# Patient Record
Sex: FEMALE | Race: WHITE | NOT HISPANIC OR LATINO | Employment: FULL TIME | URBAN - NONMETROPOLITAN AREA
[De-identification: names, ages, dates, MRNs, and addresses within clinical notes are randomized per-mention and may not be internally consistent; named-entity substitution may affect disease eponyms.]

---

## 2018-06-04 ENCOUNTER — OFFICE VISIT (OUTPATIENT)
Dept: SURGERY | Facility: CLINIC | Age: 53
End: 2018-06-04

## 2018-06-04 VITALS
BODY MASS INDEX: 34.66 KG/M2 | HEIGHT: 64 IN | WEIGHT: 203 LBS | SYSTOLIC BLOOD PRESSURE: 110 MMHG | RESPIRATION RATE: 16 BRPM | DIASTOLIC BLOOD PRESSURE: 74 MMHG | HEART RATE: 72 BPM

## 2018-06-04 DIAGNOSIS — R11.0 NAUSEA: ICD-10-CM

## 2018-06-04 DIAGNOSIS — R10.9 RIGHT SIDED ABDOMINAL PAIN: ICD-10-CM

## 2018-06-04 DIAGNOSIS — Z86.010 HISTORY OF COLON POLYPS: ICD-10-CM

## 2018-06-04 DIAGNOSIS — R14.0 BLOATING: ICD-10-CM

## 2018-06-04 DIAGNOSIS — R19.4 CHANGE IN BOWEL HABITS: ICD-10-CM

## 2018-06-04 DIAGNOSIS — R10.9 LEFT SIDED ABDOMINAL PAIN: Primary | ICD-10-CM

## 2018-06-04 DIAGNOSIS — R13.19 OTHER DYSPHAGIA: ICD-10-CM

## 2018-06-04 PROBLEM — Z86.0100 HISTORY OF COLON POLYPS: Status: ACTIVE | Noted: 2018-06-04

## 2018-06-04 PROCEDURE — 99214 OFFICE O/P EST MOD 30 MIN: CPT | Performed by: SURGERY

## 2018-06-04 RX ORDER — CITALOPRAM 20 MG/1
20 TABLET ORAL NIGHTLY
COMMUNITY

## 2018-06-04 RX ORDER — OMEPRAZOLE 20 MG/1
20 CAPSULE, DELAYED RELEASE ORAL DAILY
COMMUNITY

## 2018-06-04 RX ORDER — LEVOTHYROXINE SODIUM 0.03 MG/1
25 TABLET ORAL DAILY
COMMUNITY

## 2018-06-04 RX ORDER — ATORVASTATIN CALCIUM 40 MG/1
40 TABLET, FILM COATED ORAL NIGHTLY
COMMUNITY

## 2018-06-04 RX ORDER — LEVOTHYROXINE SODIUM 0.2 MG/1
225 TABLET ORAL DAILY
COMMUNITY

## 2018-06-04 NOTE — H&P
Gisela Martinez 53 y.o. female presents @ the req of Dr. Nick for eval of EGD/C-SCOPE. Pt c/o LLQ and RLQ pain, bloating, chg in bowel habits, constipation/diarrhea alternating, CP, diff swallowing solids and liquids, + fam H/O colon CA, + fam H/O colon polyps, + personal H/O polys, stating last c-scope =18 polyps, heartburn, and nausea.  Pt reports recent inpt admission for CP and passing out @ [facility].  Pt states she was seen, eval, and treated per Dr. Presley.  Echo and stress test performed.  Pt states all results neg.       HPI     Above noted and agree.  Gisela is known to my service.  I have removed her gallbladder and removed many colon polyps.  She has a strong family history of colon issues.  Her father had colon cancer.  Her sister started having colon polyps removed at age 7.  Her last colonoscopy was 2 years ago where 18 polyps were removed.  She has been having issues with constipation alternating with diarrhea.  She has been having left sided and right sided abdominal pain.  She feels bloated and she has been having difficulty swallowing solids and liquids.  She has been feeling hoarse for the last several weeks.  She had an episode of chest pain and was admitted to [facility].  She had a negative stress test and negative echocardiogram.  She still feels tight.  She is getting a pulmonology workup.  She also gets bloated at times.  She also is having issues with spinal stenosis and arthritis of her neck.  She is otherwise without complaints.     Review of Systems   All other systems reviewed and are negative.          Past Medical History:   Diagnosis Date   • Anxiety    • Arthritis    • GERD (gastroesophageal reflux disease)    • Hyperlipidemia    • Hypothyroidism (acquired)    • PUD (peptic ulcer disease)    • Seasonal allergies            Past Surgical History:   Procedure Laterality Date   • COLONOSCOPY W/ POLYPECTOMY     • HYSTERECTOMY     • LAPAROSCOPIC CHOLECYSTECTOMY     • TUBAL  "ABDOMINAL LIGATION             Physical Exam   Constitutional: She is oriented to person, place, and time. She appears well-developed and well-nourished.   HENT:   Head: Normocephalic and atraumatic.   Cardiovascular: Normal rate and regular rhythm.    Pulmonary/Chest: Effort normal and breath sounds normal.   Abdominal: Soft. Bowel sounds are normal.   Musculoskeletal: She exhibits no edema or deformity.   Neurological: She is alert and oriented to person, place, and time.   Skin: Skin is warm and dry.   Psychiatric: She has a normal mood and affect. Her behavior is normal.   Nursing note and vitals reviewed.          /74   Pulse 72   Resp 16   Ht 162.6 cm (64\")   Wt 92.1 kg (203 lb)   BMI 34.84 kg/m²          Gisela was seen today for egd and colonoscopy.    Diagnoses and all orders for this visit:    Left sided abdominal pain    Right sided abdominal pain    Change in bowel habits    History of colon polyps    Bloating    Nausea    Other dysphagia    We discussed tobacco cessation.  We will schedule Gisela for an EGD and colonoscopy.  I discussed with the patient the benefits and risks of performing endoscopy.  Benefits and risks were not limited to but including bleeding, infection, perforation, complications of anesthesia, aspiration.  The patient appeared to understand and is willing to proceed.    Thank you for allowing me to participate in the care of this interesting patient.            "

## 2018-06-04 NOTE — PROGRESS NOTES
Gisela Martinez 53 y.o. female presents @ the req of Dr. Nick for eval of EGD/C-SCOPE. Pt c/o LLQ and RLQ pain, bloating, chg in bowel habits, constipation/diarrhea alternating, CP, diff swallowing solids and liquids, + fam H/O colon CA, + fam H/O colon polyps, + personal H/O polys, stating last c-scope =18 polyps, heartburn, and nausea.  Pt reports recent inpt admission for CP and passing out @ [facility].  Pt states she was seen, eval, and treated per Dr. Presley.  Echo and stress test performed.  Pt states all results neg.       HPI     Above noted and agree.  Gisela is known to my service.  I have removed her gallbladder and removed many colon polyps.  She has a strong family history of colon issues.  Her father had colon cancer.  Her sister started having colon polyps removed at age 7.  Her last colonoscopy was 2 years ago where 18 polyps were removed.  She has been having issues with constipation alternating with diarrhea.  She has been having left sided and right sided abdominal pain.  She feels bloated and she has been having difficulty swallowing solids and liquids.  She has been feeling hoarse for the last several weeks.  She had an episode of chest pain and was admitted to [facility].  She had a negative stress test and negative echocardiogram.  She still feels tight.  She is getting a pulmonology workup.  She also gets bloated at times.  She also is having issues with spinal stenosis and arthritis of her neck.  She is otherwise without complaints.     Review of Systems   All other systems reviewed and are negative.          Past Medical History:   Diagnosis Date   • Anxiety    • Arthritis    • GERD (gastroesophageal reflux disease)    • Hyperlipidemia    • Hypothyroidism (acquired)    • PUD (peptic ulcer disease)    • Seasonal allergies            Past Surgical History:   Procedure Laterality Date   • COLONOSCOPY W/ POLYPECTOMY     • HYSTERECTOMY     • LAPAROSCOPIC CHOLECYSTECTOMY     • TUBAL  "ABDOMINAL LIGATION             Physical Exam   Constitutional: She is oriented to person, place, and time. She appears well-developed and well-nourished.   HENT:   Head: Normocephalic and atraumatic.   Cardiovascular: Normal rate and regular rhythm.    Pulmonary/Chest: Effort normal and breath sounds normal.   Abdominal: Soft. Bowel sounds are normal.   Musculoskeletal: She exhibits no edema or deformity.   Neurological: She is alert and oriented to person, place, and time.   Skin: Skin is warm and dry.   Psychiatric: She has a normal mood and affect. Her behavior is normal.   Nursing note and vitals reviewed.          /74   Pulse 72   Resp 16   Ht 162.6 cm (64\")   Wt 92.1 kg (203 lb)   BMI 34.84 kg/m²         Gisela was seen today for egd and colonoscopy.    Diagnoses and all orders for this visit:    Left sided abdominal pain    Right sided abdominal pain    Change in bowel habits    History of colon polyps    Bloating    Nausea    Other dysphagia    We discussed tobacco cessation.  We will schedule Gisela for an EGD and colonoscopy.  I discussed with the patient the benefits and risks of performing endoscopy.  Benefits and risks were not limited to but including bleeding, infection, perforation, complications of anesthesia, aspiration.  The patient appeared to understand and is willing to proceed.    Thank you for allowing me to participate in the care of this interesting patient.          "

## 2018-06-07 ENCOUNTER — TELEPHONE (OUTPATIENT)
Dept: SURGERY | Facility: CLINIC | Age: 53
End: 2018-06-07

## 2018-06-07 NOTE — TELEPHONE ENCOUNTER
Appt sched with Dr. Beatty per Dr. Stephens's orders 06/11/2018 @10:00 am.  Dr. Beatty's office reports pt has been sched to see him 2 times previous and [facility].  Pt informed and reports she was never notified of any other appts and she will keep this appt sched in [place].

## 2018-06-18 ENCOUNTER — ANESTHESIA EVENT (OUTPATIENT)
Dept: PERIOP | Facility: HOSPITAL | Age: 53
End: 2018-06-18

## 2018-06-18 RX ORDER — TIZANIDINE HYDROCHLORIDE 2 MG/1
2 CAPSULE, GELATIN COATED ORAL 3 TIMES DAILY PRN
COMMUNITY

## 2018-06-18 RX ORDER — TRAMADOL HYDROCHLORIDE 50 MG/1
50 TABLET ORAL EVERY 8 HOURS PRN
COMMUNITY
End: 2018-10-03

## 2018-06-19 ENCOUNTER — HOSPITAL ENCOUNTER (OUTPATIENT)
Facility: HOSPITAL | Age: 53
Setting detail: HOSPITAL OUTPATIENT SURGERY
Discharge: HOME | End: 2018-06-19
Attending: SURGERY | Admitting: SURGERY

## 2018-06-19 ENCOUNTER — ANESTHESIA (OUTPATIENT)
Dept: PERIOP | Facility: HOSPITAL | Age: 53
End: 2018-06-19

## 2018-06-19 VITALS
OXYGEN SATURATION: 96 % | HEART RATE: 53 BPM | SYSTOLIC BLOOD PRESSURE: 115 MMHG | WEIGHT: 197.4 LBS | RESPIRATION RATE: 16 BRPM | DIASTOLIC BLOOD PRESSURE: 68 MMHG | HEIGHT: 64 IN | TEMPERATURE: 97.9 F | BODY MASS INDEX: 33.7 KG/M2

## 2018-06-19 DIAGNOSIS — R14.0 BLOATING: ICD-10-CM

## 2018-06-19 DIAGNOSIS — R13.19 OTHER DYSPHAGIA: ICD-10-CM

## 2018-06-19 DIAGNOSIS — R10.9 RIGHT SIDED ABDOMINAL PAIN: ICD-10-CM

## 2018-06-19 DIAGNOSIS — R11.0 NAUSEA: ICD-10-CM

## 2018-06-19 DIAGNOSIS — Z86.010 HISTORY OF COLON POLYPS: ICD-10-CM

## 2018-06-19 DIAGNOSIS — R10.9 LEFT SIDED ABDOMINAL PAIN: ICD-10-CM

## 2018-06-19 DIAGNOSIS — R19.4 CHANGE IN BOWEL HABITS: ICD-10-CM

## 2018-06-19 PROCEDURE — 25010000002 GLUCAGON (RDNA) PER 1 MG: Performed by: NURSE ANESTHETIST, CERTIFIED REGISTERED

## 2018-06-19 PROCEDURE — 87081 CULTURE SCREEN ONLY: CPT | Performed by: SURGERY

## 2018-06-19 PROCEDURE — 45380 COLONOSCOPY AND BIOPSY: CPT | Performed by: SURGERY

## 2018-06-19 PROCEDURE — 25010000002 PROPOFOL 10 MG/ML EMULSION: Performed by: NURSE ANESTHETIST, CERTIFIED REGISTERED

## 2018-06-19 PROCEDURE — 43239 EGD BIOPSY SINGLE/MULTIPLE: CPT | Performed by: SURGERY

## 2018-06-19 RX ORDER — SODIUM CHLORIDE, SODIUM LACTATE, POTASSIUM CHLORIDE, CALCIUM CHLORIDE 600; 310; 30; 20 MG/100ML; MG/100ML; MG/100ML; MG/100ML
9 INJECTION, SOLUTION INTRAVENOUS CONTINUOUS
Status: DISCONTINUED | OUTPATIENT
Start: 2018-06-19 | End: 2018-06-19 | Stop reason: HOSPADM

## 2018-06-19 RX ORDER — PROPOFOL 10 MG/ML
VIAL (ML) INTRAVENOUS AS NEEDED
Status: DISCONTINUED | OUTPATIENT
Start: 2018-06-19 | End: 2018-06-19 | Stop reason: SURG

## 2018-06-19 RX ORDER — LIDOCAINE HYDROCHLORIDE 10 MG/ML
0.5 INJECTION, SOLUTION EPIDURAL; INFILTRATION; INTRACAUDAL; PERINEURAL ONCE AS NEEDED
Status: COMPLETED | OUTPATIENT
Start: 2018-06-19 | End: 2018-06-19

## 2018-06-19 RX ORDER — SUCRALFATE 1 G/1
1 TABLET ORAL 4 TIMES DAILY
Qty: 120 TABLET | Refills: 1 | Status: SHIPPED | OUTPATIENT
Start: 2018-06-19 | End: 2019-06-19

## 2018-06-19 RX ORDER — LIDOCAINE HYDROCHLORIDE 20 MG/ML
INJECTION, SOLUTION INFILTRATION; PERINEURAL AS NEEDED
Status: DISCONTINUED | OUTPATIENT
Start: 2018-06-19 | End: 2018-06-19 | Stop reason: SURG

## 2018-06-19 RX ORDER — MEPERIDINE HYDROCHLORIDE 25 MG/ML
12.5 INJECTION INTRAMUSCULAR; INTRAVENOUS; SUBCUTANEOUS
Status: CANCELLED | OUTPATIENT
Start: 2018-06-19 | End: 2018-06-20

## 2018-06-19 RX ORDER — SODIUM CHLORIDE 0.9 % (FLUSH) 0.9 %
1-10 SYRINGE (ML) INJECTION AS NEEDED
Status: DISCONTINUED | OUTPATIENT
Start: 2018-06-19 | End: 2018-06-19 | Stop reason: HOSPADM

## 2018-06-19 RX ORDER — SODIUM CHLORIDE, SODIUM LACTATE, POTASSIUM CHLORIDE, CALCIUM CHLORIDE 600; 310; 30; 20 MG/100ML; MG/100ML; MG/100ML; MG/100ML
100 INJECTION, SOLUTION INTRAVENOUS CONTINUOUS
Status: CANCELLED | OUTPATIENT
Start: 2018-06-19

## 2018-06-19 RX ORDER — ONDANSETRON 2 MG/ML
4 INJECTION INTRAMUSCULAR; INTRAVENOUS ONCE AS NEEDED
Status: CANCELLED | OUTPATIENT
Start: 2018-06-19

## 2018-06-19 RX ORDER — SODIUM CHLORIDE 9 MG/ML
40 INJECTION, SOLUTION INTRAVENOUS AS NEEDED
Status: DISCONTINUED | OUTPATIENT
Start: 2018-06-19 | End: 2018-06-19 | Stop reason: HOSPADM

## 2018-06-19 RX ORDER — GLYCOPYRROLATE 0.2 MG/ML
INJECTION INTRAMUSCULAR; INTRAVENOUS AS NEEDED
Status: DISCONTINUED | OUTPATIENT
Start: 2018-06-19 | End: 2018-06-19 | Stop reason: SURG

## 2018-06-19 RX ADMIN — LIDOCAINE HYDROCHLORIDE 0.5 ML: 10 INJECTION, SOLUTION EPIDURAL; INFILTRATION; INTRACAUDAL; PERINEURAL at 08:45

## 2018-06-19 RX ADMIN — PROPOFOL 50 MG: 10 INJECTION, EMULSION INTRAVENOUS at 10:06

## 2018-06-19 RX ADMIN — PROPOFOL 50 MG: 10 INJECTION, EMULSION INTRAVENOUS at 10:20

## 2018-06-19 RX ADMIN — PROPOFOL 50 MG: 10 INJECTION, EMULSION INTRAVENOUS at 10:19

## 2018-06-19 RX ADMIN — GLYCOPYRROLATE 0.2 MG: 0.2 INJECTION INTRAMUSCULAR; INTRAVENOUS at 09:54

## 2018-06-19 RX ADMIN — GLUCAGON HYDROCHLORIDE 1 MG: KIT at 10:13

## 2018-06-19 RX ADMIN — SODIUM CHLORIDE, POTASSIUM CHLORIDE, SODIUM LACTATE AND CALCIUM CHLORIDE 9 ML/HR: 600; 310; 30; 20 INJECTION, SOLUTION INTRAVENOUS at 08:45

## 2018-06-19 RX ADMIN — LIDOCAINE HYDROCHLORIDE 100 MG: 20 INJECTION, SOLUTION INFILTRATION; PERINEURAL at 09:58

## 2018-06-19 RX ADMIN — PROPOFOL 50 MG: 10 INJECTION, EMULSION INTRAVENOUS at 10:00

## 2018-06-19 RX ADMIN — PROPOFOL 50 MG: 10 INJECTION, EMULSION INTRAVENOUS at 10:24

## 2018-06-19 RX ADMIN — PROPOFOL 50 MG: 10 INJECTION, EMULSION INTRAVENOUS at 10:25

## 2018-06-19 RX ADMIN — PROPOFOL 100 MG: 10 INJECTION, EMULSION INTRAVENOUS at 09:58

## 2018-06-19 RX ADMIN — PROPOFOL 50 MG: 10 INJECTION, EMULSION INTRAVENOUS at 10:09

## 2018-06-19 RX ADMIN — PROPOFOL 40 MG: 10 INJECTION, EMULSION INTRAVENOUS at 10:30

## 2018-06-19 RX ADMIN — PROPOFOL 50 MG: 10 INJECTION, EMULSION INTRAVENOUS at 10:14

## 2018-06-19 RX ADMIN — PROPOFOL 50 MG: 10 INJECTION, EMULSION INTRAVENOUS at 10:03

## 2018-06-19 NOTE — ANESTHESIA PREPROCEDURE EVALUATION
Anesthesia Evaluation     no history of anesthetic complications:  NPO Solid Status: > 8 hours  NPO Liquid Status: > 8 hours           Airway   Dental      Pulmonary    (+) a smoker (1 ppd) Current Smoked day of surgery,   (-) sleep apnea  Cardiovascular       ROS comment: Recent negative workup    Neuro/Psych  (+) syncope (may 2018, work up negative), psychiatric history Anxiety,     GI/Hepatic/Renal/Endo    (+)  GERD (history of, none at present) well controlled, PUD,  hypothyroidism,   Diabetes: low blood sugar.    Musculoskeletal     (+) back pain (spinal stenosis), neck pain (arthritis), radiculopathy Left lower extremity and Right lower extremity  Abdominal    Substance History - negative use     OB/GYN negative ob/gyn ROS         Other   (+) arthritis (neck and lumbar spine, jaw)                   Anesthesia Plan    ASA 2     MAC     intravenous induction   Anesthetic plan and risks discussed with patient and spouse/significant other.

## 2018-06-19 NOTE — H&P (VIEW-ONLY)
Gisela Martinez 53 y.o. female presents @ the req of Dr. Nick for eval of EGD/C-SCOPE. Pt c/o LLQ and RLQ pain, bloating, chg in bowel habits, constipation/diarrhea alternating, CP, diff swallowing solids and liquids, + fam H/O colon CA, + fam H/O colon polyps, + personal H/O polys, stating last c-scope =18 polyps, heartburn, and nausea.  Pt reports recent inpt admission for CP and passing out @ [facility].  Pt states she was seen, eval, and treated per Dr. Presley.  Echo and stress test performed.  Pt states all results neg.       HPI     Above noted and agree.  Gisela is known to my service.  I have removed her gallbladder and removed many colon polyps.  She has a strong family history of colon issues.  Her father had colon cancer.  Her sister started having colon polyps removed at age 7.  Her last colonoscopy was 2 years ago where 18 polyps were removed.  She has been having issues with constipation alternating with diarrhea.  She has been having left sided and right sided abdominal pain.  She feels bloated and she has been having difficulty swallowing solids and liquids.  She has been feeling hoarse for the last several weeks.  She had an episode of chest pain and was admitted to [facility].  She had a negative stress test and negative echocardiogram.  She still feels tight.  She is getting a pulmonology workup.  She also gets bloated at times.  She also is having issues with spinal stenosis and arthritis of her neck.  She is otherwise without complaints.     Review of Systems   All other systems reviewed and are negative.          Past Medical History:   Diagnosis Date   • Anxiety    • Arthritis    • GERD (gastroesophageal reflux disease)    • Hyperlipidemia    • Hypothyroidism (acquired)    • PUD (peptic ulcer disease)    • Seasonal allergies            Past Surgical History:   Procedure Laterality Date   • COLONOSCOPY W/ POLYPECTOMY     • HYSTERECTOMY     • LAPAROSCOPIC CHOLECYSTECTOMY     • TUBAL  "ABDOMINAL LIGATION             Physical Exam   Constitutional: She is oriented to person, place, and time. She appears well-developed and well-nourished.   HENT:   Head: Normocephalic and atraumatic.   Cardiovascular: Normal rate and regular rhythm.    Pulmonary/Chest: Effort normal and breath sounds normal.   Abdominal: Soft. Bowel sounds are normal.   Musculoskeletal: She exhibits no edema or deformity.   Neurological: She is alert and oriented to person, place, and time.   Skin: Skin is warm and dry.   Psychiatric: She has a normal mood and affect. Her behavior is normal.   Nursing note and vitals reviewed.          /74   Pulse 72   Resp 16   Ht 162.6 cm (64\")   Wt 92.1 kg (203 lb)   BMI 34.84 kg/m²         Gisela was seen today for egd and colonoscopy.    Diagnoses and all orders for this visit:    Left sided abdominal pain    Right sided abdominal pain    Change in bowel habits    History of colon polyps    Bloating    Nausea    Other dysphagia    We discussed tobacco cessation.  We will schedule Gisela for an EGD and colonoscopy.  I discussed with the patient the benefits and risks of performing endoscopy.  Benefits and risks were not limited to but including bleeding, infection, perforation, complications of anesthesia, aspiration.  The patient appeared to understand and is willing to proceed.    Thank you for allowing me to participate in the care of this interesting patient.          "

## 2018-06-19 NOTE — OP NOTE
EGD and Colonoscopy Procedure Note      Pre-operative Diagnosis:  Nausea [R11.0]  Bloating [R14.0]  Change in bowel habits [R19.4]  Other dysphagia [R13.19]  Right sided abdominal pain [R10.9]  Left sided abdominal pain [R10.9]  History of colon polyps [Z86.010]    Post-operative Diagnosis: Gastritis, polyps of the right colon, left colon, and rectum    Procedure:  EGD with with biopsy for H. pylori with cold forceps and  Colonoscopy with polypectomy with cold forceps     Surgeon: Kat    Anesthetic: MAC     Estimated Blood Loss:  Minimal    Complications:  None    Indications:  Preop diagnosis    Findings/Treatments:   Gastritis-biopsy for H. pylori with cold forceps  Polyps of the right colon, left colon, and rectum-removed with cold forceps       Scope Withdrawal Time:  6 minutes      Recommendations:  Wait pathology      Procedure Details     After discussing the benefits and risks of an EGD and Colonoscopy, benefits and risks not limited to but including:  Bleeding, infection, perforation, aspiration; informed consent was signed.  The patient was taken into the endoscopy suite at [facility] and placed in the left lateral decubitus position.  MAC anesthesia was induced under appropriate monitoring.  Bite block was placed and the gastroscope was inserted thru such and advanced under direct vision to second portion of the duodenum.  A careful inspection was made as the gastroscope was withdrawn, including a retroflexed view of the proximal stomach; findings and interventions are described below.  If biopsies were taken, this was done with the cold biopsy forceps.    The bed was then rotated 108 degrees.  A rectal exam was performed.  Sphincter tone was normal.  The colonoscope was then inserted and carefully advanced to the cecum while visualizing the mucosa.  The cecum was identified by the ileocecal valve and the orifice of the appendix.  Once in the cecum, the scope withdrawn in the  right colon where a polyp was removed in piecemeal fashion with cold forceps.  The scope was then withdrawn while carefully evaluated mucosa and deflating air.  In left colon there was a polyp removed with cold forceps.  In the rectum there were 2 small polyps removed cold forceps.  The scope was then removed and Gisela was taken the recovery area in stable postoperative condition having tolerated her procedure well.    Anika Stephens DO

## 2018-06-19 NOTE — INTERVAL H&P NOTE
"  H&P reviewed. The patient was examined and there are no changes to the H&P.         /74   Pulse 72   Resp 16   Ht 162.6 cm (64\")   Wt 92.1 kg (203 lb)   BMI 34.84 kg/m²     "

## 2018-06-19 NOTE — ANESTHESIA POSTPROCEDURE EVALUATION
Patient: Gisela Martinez    Procedure Summary     Date:  06/19/18 Room / Location:  [facility] ENDOSCOPY 1 /  [facility] OR    Anesthesia Start:  0953 Anesthesia Stop:  1039    Procedures:       ESOPHAGOGASTRODUODENOSCOPY with ELLA test  (N/A Esophagus)      COLONOSCOPY and polypectomy (N/A ) Diagnosis:       Nausea      Bloating      Change in bowel habits      Other dysphagia      Right sided abdominal pain      Left sided abdominal pain      History of colon polyps      (Nausea [R11.0])      (Bloating [R14.0])      (Change in bowel habits [R19.4])      (Other dysphagia [R13.19])      (Right sided abdominal pain [R10.9])      (Left sided abdominal pain [R10.9])      (History of colon polyps [Z86.010])    Surgeon:  Anika Stephens DO Provider:  Juan Francisco Kaminski CRNA    Anesthesia Type:  MAC ASA Status:  2          Anesthesia Type: MAC  Last vitals  BP   104/71 (06/19/18 0859)   Temp   97.9 °F (36.6 °C) (06/19/18 0859)   Pulse   66 (06/19/18 0859)   Resp   16 (06/19/18 0859)     SpO2   95 % (06/19/18 0859)     Post Anesthesia Care and Evaluation    Patient location during evaluation: PHASE II  Patient participation: complete - patient cannot participate  Level of consciousness: awake and alert  Pain score: 0  Pain management: adequate  Airway patency: patent  Anesthetic complications: No anesthetic complications  PONV Status: none  Cardiovascular status: acceptable  Respiratory status: acceptable  Hydration status: acceptable

## 2018-06-20 LAB — UREASE TISS QL: NEGATIVE

## 2018-06-21 LAB
LAB AP CASE REPORT: NORMAL
PATH REPORT.FINAL DX SPEC: NORMAL

## 2018-07-13 ENCOUNTER — OFFICE VISIT (OUTPATIENT)
Dept: SURGERY | Facility: CLINIC | Age: 53
End: 2018-07-13

## 2018-07-13 DIAGNOSIS — K29.00 OTHER ACUTE GASTRITIS WITHOUT HEMORRHAGE: Primary | ICD-10-CM

## 2018-07-13 DIAGNOSIS — K63.5 POLYP OF COLON, UNSPECIFIED PART OF COLON, UNSPECIFIED TYPE: ICD-10-CM

## 2018-07-13 PROCEDURE — 99213 OFFICE O/P EST LOW 20 MIN: CPT | Performed by: SURGERY

## 2018-07-13 NOTE — PROGRESS NOTES
Gisela Martinez 53 y.o. female presents for PO FU egd/c-scope.  Pt feels well.  PCP Dr. Nick.    HPI     Gisela had Gastritis, polyps of the right colon, left colon, and rectum.  She is doing well and feeling better.  Her hoarseness has resolved.  She is tolerating a regular diet without nausea or vomiting.  She is moving her bowels well.  She has changed her diet and is feeling much better.      Review of Systems        Past Medical History:   Diagnosis Date   • Anxiety    • Arthritis    • GERD (gastroesophageal reflux disease)    • Goiter     27 years ago   • Hyperlipidemia    • Hypothyroidism (acquired)     Radiation at age 27 years   • PUD (peptic ulcer disease)    • Seasonal allergies    • Spinal stenosis            Past Surgical History:   Procedure Laterality Date   • ABDOMINAL SURGERY      Benign tumor on your left side   • COLONOSCOPY N/A 6/19/2018    Procedure: COLONOSCOPY and polypectomy;  Surgeon: Anika Stephens DO;  Location: [facility] OR;  Service: Gastroenterology   • COLONOSCOPY W/ POLYPECTOMY     • ENDOSCOPY N/A 6/19/2018    Procedure: ESOPHAGOGASTRODUODENOSCOPY with ELLA test ;  Surgeon: Anika Stephens DO;  Location: [facility] OR;  Service: Gastroenterology   • HYSTERECTOMY     • LAPAROSCOPIC CHOLECYSTECTOMY     • OTHER SURGICAL HISTORY      nodule removed from larynx   • OTHER SURGICAL HISTORY      radiated thyroid   • TUBAL ABDOMINAL LIGATION             Physical Exam   Constitutional: She is oriented to person, place, and time. She appears well-developed and well-nourished.   HENT:   Head: Normocephalic and atraumatic.   Cardiovascular: Normal rate and regular rhythm.    Pulmonary/Chest: Effort normal and breath sounds normal.   Abdominal: Soft. Bowel sounds are normal.   Musculoskeletal: She exhibits no edema or deformity.   Neurological: She is alert and oriented to person, place, and time.   Skin: Skin is warm and dry.   Psychiatric: She has a normal mood and affect. Her behavior  is normal.   Nursing note reviewed.          There were no vitals taken for this visit.        Gisela was seen today for post-op follow-up.    Diagnoses and all orders for this visit:    Other acute gastritis without hemorrhage    Polyp of colon, unspecified part of colon, unspecified type    We discussed tobacco cessation and dietary changes.  I gave her a PUD diet sheet.  She will need a three year repeat colonoscopy.    Thank you for allowing me to participate in the care of this interesting patient.

## 2018-08-15 ENCOUNTER — OFFICE VISIT (OUTPATIENT)
Dept: ORTHOPEDIC SURGERY | Facility: CLINIC | Age: 53
End: 2018-08-15

## 2018-08-15 VITALS
DIASTOLIC BLOOD PRESSURE: 77 MMHG | SYSTOLIC BLOOD PRESSURE: 114 MMHG | HEART RATE: 66 BPM | WEIGHT: 190 LBS | HEIGHT: 65 IN | BODY MASS INDEX: 31.65 KG/M2

## 2018-08-15 DIAGNOSIS — G56.03 BILATERAL CARPAL TUNNEL SYNDROME: Primary | ICD-10-CM

## 2018-08-15 PROCEDURE — 99203 OFFICE O/P NEW LOW 30 MIN: CPT | Performed by: ORTHOPAEDIC SURGERY

## 2018-08-15 NOTE — PROGRESS NOTES
Subjective: Bilateral wrist pain right worse than left     Patient ID: Gisela Martinez is a 53 y.o. female.    Chief Complaint:    History of Present Illness 53-year-old female right-hand-dominant is seen for bilateral wrist pain that she's had for over 9 months to right is worse than the left.  She's had symptoms locking with an abdomen in the past 9 months it's Worse when she's having night paresthesia with numbness and just about all the time.  She's taken occasional over-the-counter anti-inflammatories without much relief.  EMGs confirmed moderate bilateral carpal tunnel syndrome right worse than left with the amount was a patient of both the motor and sensory nerve branch.  Impingement is confined to the carpal tunnel       Social History     Occupational History   • Not on file.     Social History Main Topics   • Smoking status: Current Every Day Smoker     Packs/day: 1.00     Years: 20.00     Types: Cigarettes   • Smokeless tobacco: Never Used   • Alcohol use No   • Drug use: No   • Sexual activity: Defer      Review of Systems   Constitutional: Negative for chills, diaphoresis, fever and unexpected weight change.   HENT: Negative for hearing loss, nosebleeds, sore throat and tinnitus.    Eyes: Negative for pain and visual disturbance.   Respiratory: Negative for cough, shortness of breath and wheezing.    Cardiovascular: Negative for chest pain and palpitations.   Gastrointestinal: Negative for abdominal pain, diarrhea, nausea and vomiting.   Endocrine: Negative for cold intolerance, heat intolerance and polydipsia.   Genitourinary: Negative for difficulty urinating, dysuria and hematuria.   Musculoskeletal: Positive for arthralgias and myalgias. Negative for joint swelling.   Skin: Negative for rash and wound.   Allergic/Immunologic: Negative for environmental allergies.   Neurological: Negative for dizziness, syncope and numbness.   Hematological: Does not bruise/bleed easily.    Psychiatric/Behavioral: Negative for dysphoric mood and sleep disturbance. The patient is not nervous/anxious.          Past Medical History:   Diagnosis Date   • Anxiety    • Arthritis    • GERD (gastroesophageal reflux disease)    • Goiter     27 years ago   • Hyperlipidemia    • Hypothyroidism (acquired)     Radiation at age 27 years   • PUD (peptic ulcer disease)    • Seasonal allergies    • Spinal stenosis      Past Surgical History:   Procedure Laterality Date   • ABDOMINAL SURGERY      Benign tumor on your left side   • COLONOSCOPY N/A 6/19/2018    Procedure: COLONOSCOPY and polypectomy;  Surgeon: Anika Stephens DO;  Location: [facility] OR;  Service: Gastroenterology   • COLONOSCOPY W/ POLYPECTOMY     • ENDOSCOPY N/A 6/19/2018    Procedure: ESOPHAGOGASTRODUODENOSCOPY with ELLA test ;  Surgeon: Anika Stephens DO;  Location: [facility] OR;  Service: Gastroenterology   • HYSTERECTOMY     • LAPAROSCOPIC CHOLECYSTECTOMY     • OTHER SURGICAL HISTORY      nodule removed from larynx   • OTHER SURGICAL HISTORY      radiated thyroid   • TUBAL ABDOMINAL LIGATION       Family History   Problem Relation Age of Onset   • Colon cancer Father    • Heart disease Father    • Colon polyps Sister          Objective:  Vitals:    08/15/18 1517   BP: 114/77   Pulse: 66     1    08/15/18  1517   Weight: 86.2 kg (190 lb)     Body mass index is 31.62 kg/m².       Ortho Exam  she is alert and oriented ×3.  Head is normocephalic sclerae clear.  Right upper extremity shows full range of motion of the shoulder the elbow.  She has no radicular pain with range of motion to the neck.  Markedly positive Tinel's and Phalen's at the volar aspect of the right wrist paresthesia in the thumb index long and ring finger.  No thenar atrophy is noted.  Good capillary refill the skin is cool to touch no swelling is noted.  Left hand similarly shows full range of motion of the elbow and of the shoulder.  Markedly positive Tinel's and Phalen's at the  volar aspect of the wrist no thenar atrophy.  Paresthesia involving the thumb through ring finger.  Good capillary refill.  Tolerating anti-inflammatories again with some decrease in her symptoms not completely.  She's had cortisone injections in the past which has not given her long-lasting relief.    Assessment:       1. Bilateral carpal tunnel syndrome          Plan: Over 20 minutes was spent reviewing the patient's EMG reports of both wrists and her physical findings and outlined treatment plan going forward.  She has moderate symptoms and findings on EMG of both wrist right worse than left.  Side discussed with the patient I believe that she artery has moderate symptoms with compression and changes of both the motor and sensory nerve I believe the conservative way to treat this is with surgical decompression and prevent permanent numbness in the hand.  Discussed the surgery was involved in a rehabilitation with the patient and she was in agreement and wants to proceed with right hand ASAP tenderness set up for next Friday pending prep evaluation by her primary care physician            Work Status:    JEANE query complete.    Orders:  Orders Placed This Encounter   Procedures   • SCANNED - IMAGING       Medications:  No orders of the defined types were placed in this encounter.      Followup:  Return in about 1 week (around 8/22/2018).          Dragon transcription disclaimer     Much of this encounter note is an electronic transcription/translation of spoken language to printed text. The electronic translation of spoken language may permit erroneous, or at times, nonsensical words or phrases to be inadvertently transcribed. Although I have reviewed the note for such errors, some may still exist.

## 2018-08-16 ENCOUNTER — PREP FOR SURGERY (OUTPATIENT)
Dept: OTHER | Facility: HOSPITAL | Age: 53
End: 2018-08-16

## 2018-08-16 DIAGNOSIS — G56.01 CARPAL TUNNEL SYNDROME OF RIGHT WRIST: Primary | ICD-10-CM

## 2018-08-30 ENCOUNTER — ANESTHESIA EVENT (OUTPATIENT)
Dept: PERIOP | Facility: HOSPITAL | Age: 53
End: 2018-08-30

## 2018-08-31 ENCOUNTER — ANESTHESIA (OUTPATIENT)
Dept: PERIOP | Facility: HOSPITAL | Age: 53
End: 2018-08-31

## 2018-09-10 ENCOUNTER — TELEPHONE (OUTPATIENT)
Dept: ORTHOPEDIC SURGERY | Facility: CLINIC | Age: 53
End: 2018-09-10

## 2018-09-10 PROCEDURE — S0260 H&P FOR SURGERY: HCPCS | Performed by: ORTHOPAEDIC SURGERY

## 2018-09-11 ENCOUNTER — HOSPITAL ENCOUNTER (OUTPATIENT)
Facility: HOSPITAL | Age: 53
Setting detail: HOSPITAL OUTPATIENT SURGERY
Discharge: HOME | End: 2018-09-11
Attending: ORTHOPAEDIC SURGERY | Admitting: NURSE ANESTHETIST, CERTIFIED REGISTERED

## 2018-09-11 VITALS
RESPIRATION RATE: 16 BRPM | SYSTOLIC BLOOD PRESSURE: 133 MMHG | OXYGEN SATURATION: 96 % | BODY MASS INDEX: 35.28 KG/M2 | DIASTOLIC BLOOD PRESSURE: 70 MMHG | TEMPERATURE: 97.3 F | HEART RATE: 64 BPM | WEIGHT: 212 LBS

## 2018-09-11 DIAGNOSIS — G56.01 CARPAL TUNNEL SYNDROME OF RIGHT WRIST: ICD-10-CM

## 2018-09-11 PROCEDURE — 25010000002 PROPOFOL 10 MG/ML EMULSION: Performed by: NURSE ANESTHETIST, CERTIFIED REGISTERED

## 2018-09-11 PROCEDURE — 25010000002 DEXAMETHASONE PER 1 MG: Performed by: NURSE ANESTHETIST, CERTIFIED REGISTERED

## 2018-09-11 PROCEDURE — 25010000002 ONDANSETRON PER 1 MG: Performed by: NURSE ANESTHETIST, CERTIFIED REGISTERED

## 2018-09-11 PROCEDURE — 25010000002 MIDAZOLAM PER 1 MG: Performed by: NURSE ANESTHETIST, CERTIFIED REGISTERED

## 2018-09-11 PROCEDURE — 64721 CARPAL TUNNEL SURGERY: CPT | Performed by: ORTHOPAEDIC SURGERY

## 2018-09-11 PROCEDURE — 25010000002 ROPIVACAINE PER 1 MG: Performed by: NURSE ANESTHETIST, CERTIFIED REGISTERED

## 2018-09-11 RX ORDER — DEXAMETHASONE SODIUM PHOSPHATE 4 MG/ML
8 INJECTION, SOLUTION INTRA-ARTICULAR; INTRALESIONAL; INTRAMUSCULAR; INTRAVENOUS; SOFT TISSUE ONCE
Status: COMPLETED | OUTPATIENT
Start: 2018-09-11 | End: 2018-09-11

## 2018-09-11 RX ORDER — MAGNESIUM HYDROXIDE 1200 MG/15ML
LIQUID ORAL AS NEEDED
Status: DISCONTINUED | OUTPATIENT
Start: 2018-09-11 | End: 2018-09-11 | Stop reason: HOSPADM

## 2018-09-11 RX ORDER — ONDANSETRON 2 MG/ML
4 INJECTION INTRAMUSCULAR; INTRAVENOUS ONCE AS NEEDED
Status: CANCELLED | OUTPATIENT
Start: 2018-09-11

## 2018-09-11 RX ORDER — SODIUM CHLORIDE 9 MG/ML
40 INJECTION, SOLUTION INTRAVENOUS AS NEEDED
Status: DISCONTINUED | OUTPATIENT
Start: 2018-09-11 | End: 2018-09-11 | Stop reason: HOSPADM

## 2018-09-11 RX ORDER — LIDOCAINE HYDROCHLORIDE 20 MG/ML
INJECTION, SOLUTION EPIDURAL; INFILTRATION; INTRACAUDAL; PERINEURAL AS NEEDED
Status: DISCONTINUED | OUTPATIENT
Start: 2018-09-11 | End: 2018-09-11 | Stop reason: HOSPADM

## 2018-09-11 RX ORDER — LIDOCAINE HYDROCHLORIDE 20 MG/ML
INJECTION, SOLUTION INFILTRATION; PERINEURAL AS NEEDED
Status: DISCONTINUED | OUTPATIENT
Start: 2018-09-11 | End: 2018-09-11 | Stop reason: SURG

## 2018-09-11 RX ORDER — SODIUM CHLORIDE, SODIUM LACTATE, POTASSIUM CHLORIDE, CALCIUM CHLORIDE 600; 310; 30; 20 MG/100ML; MG/100ML; MG/100ML; MG/100ML
100 INJECTION, SOLUTION INTRAVENOUS CONTINUOUS
Status: CANCELLED | OUTPATIENT
Start: 2018-09-11

## 2018-09-11 RX ORDER — ONDANSETRON 2 MG/ML
4 INJECTION INTRAMUSCULAR; INTRAVENOUS ONCE AS NEEDED
Status: COMPLETED | OUTPATIENT
Start: 2018-09-11 | End: 2018-09-11

## 2018-09-11 RX ORDER — HYDROCODONE BITARTRATE AND ACETAMINOPHEN 5; 325 MG/1; MG/1
1 TABLET ORAL EVERY 4 HOURS PRN
Qty: 30 TABLET | Refills: 0 | Status: SHIPPED | OUTPATIENT
Start: 2018-09-11 | End: 2018-10-03

## 2018-09-11 RX ORDER — LIDOCAINE HYDROCHLORIDE 15 MG/ML
INJECTION, SOLUTION EPIDURAL; INFILTRATION; INTRACAUDAL; PERINEURAL AS NEEDED
Status: DISCONTINUED | OUTPATIENT
Start: 2018-09-11 | End: 2018-09-11 | Stop reason: SURG

## 2018-09-11 RX ORDER — MIDAZOLAM HYDROCHLORIDE 1 MG/ML
2 INJECTION INTRAMUSCULAR; INTRAVENOUS
Status: DISCONTINUED | OUTPATIENT
Start: 2018-09-11 | End: 2018-09-11 | Stop reason: HOSPADM

## 2018-09-11 RX ORDER — SODIUM CHLORIDE, SODIUM LACTATE, POTASSIUM CHLORIDE, CALCIUM CHLORIDE 600; 310; 30; 20 MG/100ML; MG/100ML; MG/100ML; MG/100ML
9 INJECTION, SOLUTION INTRAVENOUS CONTINUOUS
Status: DISCONTINUED | OUTPATIENT
Start: 2018-09-11 | End: 2018-09-11 | Stop reason: HOSPADM

## 2018-09-11 RX ORDER — SODIUM CHLORIDE 0.9 % (FLUSH) 0.9 %
1-10 SYRINGE (ML) INJECTION AS NEEDED
Status: DISCONTINUED | OUTPATIENT
Start: 2018-09-11 | End: 2018-09-11 | Stop reason: HOSPADM

## 2018-09-11 RX ORDER — BACITRACIN ZINC 500 [USP'U]/G
OINTMENT TOPICAL AS NEEDED
Status: DISCONTINUED | OUTPATIENT
Start: 2018-09-11 | End: 2018-09-11 | Stop reason: HOSPADM

## 2018-09-11 RX ORDER — MIDAZOLAM HYDROCHLORIDE 1 MG/ML
1 INJECTION INTRAMUSCULAR; INTRAVENOUS
Status: DISCONTINUED | OUTPATIENT
Start: 2018-09-11 | End: 2018-09-11 | Stop reason: HOSPADM

## 2018-09-11 RX ORDER — LIDOCAINE HYDROCHLORIDE 10 MG/ML
0.5 INJECTION, SOLUTION EPIDURAL; INFILTRATION; INTRACAUDAL; PERINEURAL ONCE AS NEEDED
Status: COMPLETED | OUTPATIENT
Start: 2018-09-11 | End: 2018-09-11

## 2018-09-11 RX ORDER — LIDOCAINE HYDROCHLORIDE 10 MG/ML
0.5 INJECTION, SOLUTION EPIDURAL; INFILTRATION; INTRACAUDAL; PERINEURAL ONCE AS NEEDED
Status: DISCONTINUED | OUTPATIENT
Start: 2018-09-11 | End: 2018-09-11 | Stop reason: HOSPADM

## 2018-09-11 RX ORDER — PROPOFOL 10 MG/ML
VIAL (ML) INTRAVENOUS AS NEEDED
Status: DISCONTINUED | OUTPATIENT
Start: 2018-09-11 | End: 2018-09-11 | Stop reason: SURG

## 2018-09-11 RX ORDER — ROPIVACAINE HYDROCHLORIDE 2 MG/ML
INJECTION, SOLUTION EPIDURAL; INFILTRATION; PERINEURAL AS NEEDED
Status: DISCONTINUED | OUTPATIENT
Start: 2018-09-11 | End: 2018-09-11 | Stop reason: SURG

## 2018-09-11 RX ADMIN — SODIUM CHLORIDE, POTASSIUM CHLORIDE, SODIUM LACTATE AND CALCIUM CHLORIDE: 600; 310; 30; 20 INJECTION, SOLUTION INTRAVENOUS at 07:23

## 2018-09-11 RX ADMIN — PROPOFOL 20 MG: 10 INJECTION, EMULSION INTRAVENOUS at 07:37

## 2018-09-11 RX ADMIN — MIDAZOLAM HYDROCHLORIDE 2 MG: 1 INJECTION, SOLUTION INTRAMUSCULAR; INTRAVENOUS at 06:57

## 2018-09-11 RX ADMIN — ONDANSETRON 4 MG: 2 INJECTION INTRAMUSCULAR; INTRAVENOUS at 06:57

## 2018-09-11 RX ADMIN — PROPOFOL 30 MG: 10 INJECTION, EMULSION INTRAVENOUS at 07:42

## 2018-09-11 RX ADMIN — ROPIVACAINE HYDROCHLORIDE 10 ML: 2 INJECTION, SOLUTION EPIDURAL; INFILTRATION at 07:12

## 2018-09-11 RX ADMIN — LIDOCAINE HYDROCHLORIDE 100 MG: 20 INJECTION, SOLUTION INFILTRATION; PERINEURAL at 07:30

## 2018-09-11 RX ADMIN — PROPOFOL 50 MG: 10 INJECTION, EMULSION INTRAVENOUS at 07:45

## 2018-09-11 RX ADMIN — PROPOFOL 50 MG: 10 INJECTION, EMULSION INTRAVENOUS at 07:48

## 2018-09-11 RX ADMIN — SODIUM CHLORIDE, POTASSIUM CHLORIDE, SODIUM LACTATE AND CALCIUM CHLORIDE 9 ML/HR: 600; 310; 30; 20 INJECTION, SOLUTION INTRAVENOUS at 06:37

## 2018-09-11 RX ADMIN — PROPOFOL 50 MG: 10 INJECTION, EMULSION INTRAVENOUS at 07:39

## 2018-09-11 RX ADMIN — LIDOCAINE HYDROCHLORIDE 10 ML: 15 INJECTION, SOLUTION EPIDURAL; INFILTRATION; INTRACAUDAL; PERINEURAL at 07:12

## 2018-09-11 RX ADMIN — FAMOTIDINE 20 MG: 10 INJECTION, SOLUTION INTRAVENOUS at 06:57

## 2018-09-11 RX ADMIN — LIDOCAINE HYDROCHLORIDE 0.5 ML: 10 INJECTION, SOLUTION EPIDURAL; INFILTRATION; INTRACAUDAL; PERINEURAL at 06:37

## 2018-09-11 RX ADMIN — DEXAMETHASONE SODIUM PHOSPHATE 8 MG: 4 INJECTION, SOLUTION INTRAMUSCULAR; INTRAVENOUS at 06:57

## 2018-09-11 NOTE — OP NOTE
Preoperative Diagnosis:Right carpal tunnel syndrome.        Postoperative Diagnosis: Right carpal tunnel syndrome.          Procedure Performed:  Right carpal tunnel release.        Surgeon: Chico Maddox MD          Assistant:      Anesthesia: Wrist block with MAC.          Anesthetist: Kathleen Christian CRNA    TOURNIQUET TIME: 7 minutes.        Drains: None.        Complications: None.            Indications for procedure:A 53-year-old female with carpal tunnel symptoms that remain refractory to nonoperative management.              Operative Note:Patient brought to the operating room, and after satisfactory anesthesia was obtained, a pneumonic tourniquet was placed on the right upper arm. The right  arm was then prepped and after the prep dried for 3 minutes, it was draped in a sterile field in usual manner. Timeout was completed prior to the prepping and after the prepping phase. The arm was then exsanguinated, tourniquet elevated to 250. Under loop magnification, procedure was carried out. A linear incision was made in the palm and marked, extending out to the distal volar crease. The initial incision elicited a pain response from the patient; therefore, 10 mL of 2% lidocaine was injected into the incisional area, and the patient was then comfortable. Via blunt and sharp dissection, the distal aspect of the transverse carpal ligament was identified and then was released distal to proximal. After complete release was carried out and identification of the median nerve was completed, the tourniquet was released with hemostasis obtained with bipolar cautery. The incision was closed in interrupted 3-0 nylon and a bulky compressive dressing applied. Patient transferred to recovery, having tolerated procedure well. All counts were correct.                Dictated utilizing Dragon dictation

## 2018-09-11 NOTE — ANESTHESIA POSTPROCEDURE EVALUATION
Patient: Gisela Martinez    Procedure Summary     Date:  09/11/18 Room / Location:  [facility] OR 4 /  [facility] OR    Anesthesia Start:  0723 Anesthesia Stop:  0805    Procedure:  CARPAL TUNNEL RELEASE (Right Wrist) Diagnosis:       Carpal tunnel syndrome of right wrist      (Carpal tunnel syndrome of right wrist [G56.01])    Surgeon:  Chico Maddox MD Provider:  Kathleen Christian CRNA    Anesthesia Type:  MAC, regional ASA Status:  2          Anesthesia Type: MAC, regional  Last vitals  BP   129/76 (09/11/18 0814)   Temp   97.3 °F (36.3 °C) (09/11/18 0814)   Pulse   65 (09/11/18 0814)   Resp   14 (09/11/18 0814)     SpO2   95 % (09/11/18 0814)     Post Anesthesia Care and Evaluation    Patient location during evaluation: bedside  Patient participation: complete - patient participated  Level of consciousness: awake and alert  Pain score: 0  Pain management: adequate  Airway patency: patent  Anesthetic complications: No anesthetic complications    Cardiovascular status: acceptable  Respiratory status: acceptable  Hydration status: acceptable

## 2018-09-11 NOTE — ANESTHESIA PREPROCEDURE EVALUATION
Anesthesia Evaluation     Patient summary reviewed and Nursing notes reviewed   no history of anesthetic complications:  NPO Solid Status: > 8 hours  NPO Liquid Status: > 8 hours           Airway   Mallampati: II  TM distance: >3 FB  Neck ROM: full  No difficulty expected  Dental    (+) upper dentures    Pulmonary     breath sounds clear to auscultation  (+) a smoker (quit 4 days ago) Former Abstained day of surgery, COPD mild,   Cardiovascular   Exercise tolerance: good (4-7 METS)    ECG reviewed  Rhythm: regular  Rate: normal    (+) hyperlipidemia,     ROS comment: Swelling in LEs    Neuro/Psych  (+) numbness (yandel hands and feet), psychiatric history Anxiety,     GI/Hepatic/Renal/Endo    (+)  GERD poorly controlled, PUD,  hypothyroidism,     Musculoskeletal     (+) back pain, chronic pain (pain injections for back pain), neck stiffness,   Abdominal    Substance History      OB/GYN          Other   (+) arthritis   history of cancer (adrenal adenoma yandel)                    Anesthesia Plan    ASA 2     MAC and regional     intravenous induction   Anesthetic plan, all risks, benefits, and alternatives have been provided, discussed and informed consent has been obtained with: patient.  Use of blood products discussed with patient  Consented to blood products.

## 2018-09-11 NOTE — INTERVAL H&P NOTE
H&P updated. The patient was examined and vital   /71 (BP Location: Left arm, Patient Position: Sitting)   Pulse 65   Temp 98 °F (36.7 °C) (Oral)   Resp 16   Wt 96.2 kg (212 lb)   SpO2 96%   BMI 35.28 kg/m²

## 2018-09-11 NOTE — ADDENDUM NOTE
Addendum  created 09/11/18 1013 by Kathleen Christian, CRNA    Anesthesia Review and Sign - Ready for Procedure

## 2018-09-11 NOTE — ANESTHESIA PROCEDURE NOTES
Peripheral Block    Patient location during procedure: pre-op  Start time: 9/11/2018 7:06 AM  Stop time: 9/11/2018 7:12 AM  Reason for block: procedure for pain, at surgeon's request, post-op pain management and primary anesthetic  Performed by  CRNA: PRASHANTH LOMBARDO  Preanesthetic Checklist  Completed: patient identified, site marked, surgical consent, pre-op evaluation, timeout performed, IV checked, risks and benefits discussed and monitors and equipment checked  Prep:  Pt Position: supine  Sterile barriers:cap and gloves  Prep: ChloraPrep  Patient monitoring: blood pressure monitoring, continuous pulse oximetry and EKG  Procedure  Sedation:yes    Guidance:ultrasound guided  ULTRASOUND INTERPRETATION. Using ultrasound guidance a 21 G gauge needle was placed in close proximity to the nerve, at which point, under ultrasound guidance anesthetic was injected in the area of the nerve and spread of the anesthesia was seen on ultrasound in close proximity thereto.  There were no abnormalities seen on ultrasound; a digital image was taken; and the patient tolerated the procedure with no complications. Images:still images obtained    Laterality:right  Block Type:wrist  Injection Technique:single-shot  Needle Type:echogenic  Resistance on Injection: none  Medications  Comment:Lidocaine 1.5% 10ml ropivacaine 0.2% 10ml mixed     Post Assessment  Injection Assessment: no paresthesia on injection, incremental injection and negative aspiration for heme  Patient Tolerance:comfortable throughout block  Complications:no

## 2018-09-12 ENCOUNTER — TELEPHONE (OUTPATIENT)
Dept: ORTHOPEDIC SURGERY | Facility: CLINIC | Age: 53
End: 2018-09-12

## 2018-09-12 NOTE — TELEPHONE ENCOUNTER
Patient calling stating that [facility] was trying to contact our office leaving multiple voicemail's... There were none on my voicemail. I called and spoke with Santa at [facility] who stated the patient had multiple fills of Norco with them from [facility]. Per Bing at [facility] they are also filling the same medication.     Santa with [facility] aware to not fill RX per EEJ.    Thanks.

## 2018-09-25 ENCOUNTER — TELEPHONE (OUTPATIENT)
Dept: ORTHOPEDIC SURGERY | Facility: CLINIC | Age: 53
End: 2018-09-25

## 2018-09-25 ENCOUNTER — HOSPITAL ENCOUNTER (EMERGENCY)
Facility: HOSPITAL | Age: 53
Discharge: HOME | End: 2018-09-25
Attending: EMERGENCY MEDICINE | Admitting: EMERGENCY MEDICINE

## 2018-09-25 VITALS
OXYGEN SATURATION: 98 % | BODY MASS INDEX: 32.14 KG/M2 | SYSTOLIC BLOOD PRESSURE: 115 MMHG | HEART RATE: 76 BPM | HEIGHT: 66 IN | RESPIRATION RATE: 15 BRPM | DIASTOLIC BLOOD PRESSURE: 76 MMHG | TEMPERATURE: 97.7 F | WEIGHT: 200 LBS

## 2018-09-25 DIAGNOSIS — S63.501A SPRAIN OF RIGHT WRIST, INITIAL ENCOUNTER: Primary | ICD-10-CM

## 2018-09-25 PROCEDURE — 99282 EMERGENCY DEPT VISIT SF MDM: CPT

## 2018-09-25 NOTE — ED NOTES
Steri-strips on right wrist have a small dried blood stain on it.  Pt complains of pain and tingling in her fingertips since she fell.  Right radial pulse is strong.     Lucía Robins, RN  09/25/18 7680

## 2018-09-25 NOTE — ED PROVIDER NOTES
Subjective   History of Present Illness  History of Present Illness    Chief complaint: Patient here to be checked by Dr. Sanchez    Location:     Quality/Severity:      Timing/Onset/Duration:     Modifying Factors:     Associated Symptoms:     Narrative: This 53-year-old white female who had wrist surgery by Dr. Sanchez, fell today.  He was seen at [facility] and had x-rays of the wrist that were remarkable.  She tore open the hand incision.  The ER Steri-Strip the incision.  The patient called Dr. Maddox who told the patient come to the ER and that Dr. Maddox will see the patient.      Review of Systems     Medication List      ASK your doctor about these medications    atorvastatin 40 MG tablet  Commonly known as:  LIPITOR     citalopram 20 MG tablet  Commonly known as:  CeleXA     HYDROcodone-acetaminophen 5-325 MG per tablet  Commonly known as:  NORCO  Take 1 tablet by mouth Every 4 (Four) Hours As Needed for Moderate Pain    or Severe Pain .     * levothyroxine 200 MCG tablet  Commonly known as:  SYNTHROID, LEVOTHROID     * levothyroxine 25 MCG tablet  Commonly known as:  SYNTHROID, LEVOTHROID     omeprazole 20 MG capsule  Commonly known as:  priLOSEC     sucralfate 1 g tablet  Commonly known as:  CARAFATE  Take 1 tablet by mouth 4 (Four) Times a Day.     TiZANidine 2 MG capsule  Commonly known as:  ZANAFLEX     ULTRAM 50 MG tablet  Generic drug:  traMADol        * This list has 2 medication(s) that are the same as other medications   prescribed for you. Read the directions carefully, and ask your doctor or   other care provider to review them with you.              Past Medical History:   Diagnosis Date   • Adrenal adenoma     bilateral, having adrenal studies done    • Anxiety    • Arthritis    • GERD (gastroesophageal reflux disease)    • Goiter     27 years ago   • Hyperlipidemia    • Hypothyroidism (acquired)     Radiation at age 27 years   • PUD (peptic ulcer disease)    • Seasonal allergies    • Spinal  stenosis        Allergies   Allergen Reactions   • Venomil Honey Bee Venom [Honey Bee Venom] Anaphylaxis   • Penicillins Unknown (See Comments)     Anaphylaxis         Past Surgical History:   Procedure Laterality Date   • ABDOMINAL SURGERY      Benign tumor on your left side   • APPENDECTOMY     • CARPAL TUNNEL RELEASE Right 9/11/2018    Procedure: CARPAL TUNNEL RELEASE;  Surgeon: Chico Maddox MD;  Location: [facility] OR;  Service: Orthopedics   • COLONOSCOPY N/A 6/19/2018    Procedure: COLONOSCOPY and polypectomy;  Surgeon: Anika Stephens DO;  Location: [facility] OR;  Service: Gastroenterology   • COLONOSCOPY W/ POLYPECTOMY     • ENDOSCOPY N/A 6/19/2018    Procedure: ESOPHAGOGASTRODUODENOSCOPY with ELLA test ;  Surgeon: Anika Stephens DO;  Location: [facility] OR;  Service: Gastroenterology   • HYSTERECTOMY     • LAPAROSCOPIC CHOLECYSTECTOMY     • OTHER SURGICAL HISTORY      nodule removed from larynx   • OTHER SURGICAL HISTORY      radiated thyroid   • TUBAL ABDOMINAL LIGATION         Family History   Problem Relation Age of Onset   • Colon cancer Father    • Heart disease Father    • Colon polyps Sister    • Malig Hyperthermia Neg Hx        Social History     Social History   • Marital status:      Social History Main Topics   • Smoking status: Former Smoker     Packs/day: 1.00     Years: 20.00     Types: Cigarettes     Quit date: 9/1/2018   • Smokeless tobacco: Never Used      Comment: quit 4 days ago as of 9/11/18   • Alcohol use Yes      Comment: social, once/year   • Drug use: No   • Sexual activity: Defer     Other Topics Concern   • Not on file           Objective   Physical Exam    Procedures           ED Course      4:30 PM, 09/25/18:  Dr. Maddox was paged to come see his patient.    5:12 PM, 09/25/18:  Spoke with Dr. Velazquez, on-call for Dr. Maddox, he will come and see the patient in the emergency department.    5:56 PM, 09/25/18:  Dr. Velazquez assess the patient.  He feels that the patient is okay  to be discharged.        [facility]    No orders to display     Labs Reviewed - No data to display  No results found.    Final diagnoses:   Sprain of right wrist, initial encounter         ED Medications:  Medications - No data to display    New Medications:     Medication List      ASK your doctor about these medications    atorvastatin 40 MG tablet  Commonly known as:  LIPITOR     citalopram 20 MG tablet  Commonly known as:  CeleXA     HYDROcodone-acetaminophen 5-325 MG per tablet  Commonly known as:  NORCO  Take 1 tablet by mouth Every 4 (Four) Hours As Needed for Moderate Pain    or Severe Pain .     * levothyroxine 200 MCG tablet  Commonly known as:  SYNTHROID, LEVOTHROID     * levothyroxine 25 MCG tablet  Commonly known as:  SYNTHROID, LEVOTHROID     omeprazole 20 MG capsule  Commonly known as:  priLOSEC     sucralfate 1 g tablet  Commonly known as:  CARAFATE  Take 1 tablet by mouth 4 (Four) Times a Day.     TiZANidine 2 MG capsule  Commonly known as:  ZANAFLEX     ULTRAM 50 MG tablet  Generic drug:  traMADol        * This list has 2 medication(s) that are the same as other medications   prescribed for you. Read the directions carefully, and ask your doctor or   other care provider to review them with you.              Stopped Medications:     Medication List      ASK your doctor about these medications    atorvastatin 40 MG tablet  Commonly known as:  LIPITOR     citalopram 20 MG tablet  Commonly known as:  CeleXA     HYDROcodone-acetaminophen 5-325 MG per tablet  Commonly known as:  NORCO  Take 1 tablet by mouth Every 4 (Four) Hours As Needed for Moderate Pain    or Severe Pain .     * levothyroxine 200 MCG tablet  Commonly known as:  SYNTHROID, LEVOTHROID     * levothyroxine 25 MCG tablet  Commonly known as:  SYNTHROID, LEVOTHROID     omeprazole 20 MG capsule  Commonly known as:  priLOSEC     sucralfate 1 g tablet  Commonly known as:  CARAFATE  Take 1 tablet by mouth 4 (Four) Times a Day.     TiZANidine 2 MG  capsule  Commonly known as:  ZANAFLEX     ULTRAM 50 MG tablet  Generic drug:  traMADol        * This list has 2 medication(s) that are the same as other medications   prescribed for you. Read the directions carefully, and ask your doctor or   other care provider to review them with you.                Final diagnoses:   Sprain of right wrist, initial encounter            Coleman Young MD  09/25/18 2342

## 2018-09-25 NOTE — TELEPHONE ENCOUNTER
Patient's daughter calling stating that her mother tripped [place] and busted her incision open, the daughter also states that her mother is unable to move the wrist at all. She states she is in severe pain.  They were advised to take seek emergent treatment at the ED as Dr. Maddox is in the OR today.    Thanks.

## 2018-09-25 NOTE — DISCHARGE INSTRUCTIONS
Follow-up with Dr. Maddox this week.  Return to emergency department if there is increasing pain, numbness, tingling, weakness, change in color temperature, worsen in anyway at all.

## 2018-10-03 ENCOUNTER — OFFICE VISIT (OUTPATIENT)
Dept: ORTHOPEDIC SURGERY | Facility: CLINIC | Age: 53
End: 2018-10-03

## 2018-10-03 VITALS — HEIGHT: 65 IN | BODY MASS INDEX: 33.32 KG/M2 | WEIGHT: 200 LBS

## 2018-10-03 DIAGNOSIS — G56.03 BILATERAL CARPAL TUNNEL SYNDROME: Primary | ICD-10-CM

## 2018-10-03 PROCEDURE — 99024 POSTOP FOLLOW-UP VISIT: CPT | Performed by: ORTHOPAEDIC SURGERY

## 2018-10-03 NOTE — PROGRESS NOTES
Subjective: Status post right carpal tunnel release     Patient ID: Gisela Martinez is a 53 y.o. female.    Chief Complaint:    History of Present Illness patient is 2 weeks out from surgery is far as the paresthesia pain has resolved but she fell last week open incision and that was repaired and reclosed in the emergency room.       Social History     Occupational History   • Not on file.     Social History Main Topics   • Smoking status: Former Smoker     Packs/day: 1.00     Years: 20.00     Types: Cigarettes     Quit date: 9/1/2018   • Smokeless tobacco: Never Used      Comment: quit 4 days ago as of 9/11/18   • Alcohol use Yes      Comment: social, once/year   • Drug use: No   • Sexual activity: Defer      Review of Systems   Constitutional: Negative for chills, diaphoresis, fever and unexpected weight change.   HENT: Negative for hearing loss, nosebleeds, sore throat and tinnitus.    Eyes: Negative for pain and visual disturbance.   Respiratory: Negative for cough, shortness of breath and wheezing.    Cardiovascular: Negative for chest pain and palpitations.   Gastrointestinal: Negative for abdominal pain, diarrhea, nausea and vomiting.   Endocrine: Negative for cold intolerance, heat intolerance and polydipsia.   Genitourinary: Negative for difficulty urinating, dysuria and hematuria.   Musculoskeletal: Positive for myalgias. Negative for arthralgias and joint swelling.   Skin: Negative for rash and wound.   Allergic/Immunologic: Negative for environmental allergies.   Neurological: Negative for dizziness, syncope and numbness.   Hematological: Does not bruise/bleed easily.   Psychiatric/Behavioral: Negative for dysphoric mood and sleep disturbance. The patient is not nervous/anxious.          Past Medical History:   Diagnosis Date   • Adrenal adenoma     bilateral, having adrenal studies done    • Anxiety    • Arthritis    • GERD (gastroesophageal reflux disease)    • Goiter     27 years ago   •  Hyperlipidemia    • Hypothyroidism (acquired)     Radiation at age 27 years   • PUD (peptic ulcer disease)    • Seasonal allergies    • Spinal stenosis      Past Surgical History:   Procedure Laterality Date   • ABDOMINAL SURGERY      Benign tumor on your left side   • APPENDECTOMY     • CARPAL TUNNEL RELEASE Right 9/11/2018    Procedure: CARPAL TUNNEL RELEASE;  Surgeon: Chico Maddox MD;  Location: [facility] OR;  Service: Orthopedics   • COLONOSCOPY N/A 6/19/2018    Procedure: COLONOSCOPY and polypectomy;  Surgeon: Anika Stephens DO;  Location: [facility] OR;  Service: Gastroenterology   • COLONOSCOPY W/ POLYPECTOMY     • ENDOSCOPY N/A 6/19/2018    Procedure: ESOPHAGOGASTRODUODENOSCOPY with ELLA test ;  Surgeon: Anika Stephens DO;  Location: [facility] OR;  Service: Gastroenterology   • HYSTERECTOMY     • LAPAROSCOPIC CHOLECYSTECTOMY     • OTHER SURGICAL HISTORY      nodule removed from larynx   • OTHER SURGICAL HISTORY      radiated thyroid   • TUBAL ABDOMINAL LIGATION       Family History   Problem Relation Age of Onset   • Colon cancer Father    • Heart disease Father    • Colon polyps Sister    • Malig Hyperthermia Neg Hx          Objective:  There were no vitals filed for this visit.  1    10/03/18  1505   Weight: 90.7 kg (200 lb)     Body mass index is 33.28 kg/m².        Ortho Exam    she she is alert and oriented ×3.  The wound is benign.  But has not healed sufficiently to allow suture removal    Assessment:        1. Bilateral carpal tunnel syndrome           Plan: Return next week to evaluate the wound regarding suture removal            Work Status:    JEANE query complete.    Orders:  No orders of the defined types were placed in this encounter.      Medications:  No orders of the defined types were placed in this encounter.      Followup:  Return in about 1 week (around 10/10/2018).          Dictated utilizing Dragon dictation

## 2018-10-09 ENCOUNTER — TELEPHONE (OUTPATIENT)
Dept: ORTHOPEDIC SURGERY | Facility: CLINIC | Age: 53
End: 2018-10-09

## 2018-10-09 NOTE — TELEPHONE ENCOUNTER
Patient called and wanted to move her appointment out until the 20th.  She doesn't believe post op incision is healing and she also has other family matters.  I spoke with Dr. Maddox's MAJoana.  As of last week's visit, Dr. Maddox wanted to recheck wound for healing this week.  I explained to patient that depending the healing, Dr. Maddox may need to do further treatment on the wound and it was best if she can keep appointment tomorrow.  She was in agreement.

## 2019-08-26 NOTE — H&P
Orthopedic Surgery    Patient Care Team:  Jim Nick DO as PCP - General (Family Medicine)    CHIEF COMPLAINT:  Right carpal tunnel syndrome    HISTORY OF PRESENT ILLNESS: 53-year-old female right-hand-dominant is being admitted this time to undergo right carpal tunnel release documented per EMG is failed to respond to nonoperative management.  Have discussed with the patient the risk of surgery which include but not limited to infection and the need for multiple procedures to eradicate infection, nerve injury resulting in permanent paralysis or numbness, vascular injury, tenosynovitis, persistent numbness and/or pain and the need for recurrent surgery.  She understands and agrees to proceed.          Past Medical History:   Diagnosis Date   • Adrenal adenoma     bilateral, having adrenal studies done    • Anxiety    • Arthritis    • GERD (gastroesophageal reflux disease)    • Goiter     27 years ago   • Hyperlipidemia    • Hypothyroidism (acquired)     Radiation at age 27 years   • PUD (peptic ulcer disease)    • Seasonal allergies    • Spinal stenosis      Past Surgical History:   Procedure Laterality Date   • ABDOMINAL SURGERY      Benign tumor on your left side   • APPENDECTOMY     • COLONOSCOPY N/A 6/19/2018    Procedure: COLONOSCOPY and polypectomy;  Surgeon: Anika Stephens DO;  Location: [facility] OR;  Service: Gastroenterology   • COLONOSCOPY W/ POLYPECTOMY     • ENDOSCOPY N/A 6/19/2018    Procedure: ESOPHAGOGASTRODUODENOSCOPY with ELLA test ;  Surgeon: Anika Stephens DO;  Location: [facility] OR;  Service: Gastroenterology   • HYSTERECTOMY     • LAPAROSCOPIC CHOLECYSTECTOMY     • OTHER SURGICAL HISTORY      nodule removed from larynx   • OTHER SURGICAL HISTORY      radiated thyroid   • TUBAL ABDOMINAL LIGATION       Family History   Problem Relation Age of Onset   • Colon cancer Father    • Heart disease Father    • Colon polyps Sister    • Malig Hyperthermia Neg Hx      Social History   Substance  Use Topics   • Smoking status: Current Every Day Smoker     Packs/day: 1.00     Years: 20.00     Types: Cigarettes   • Smokeless tobacco: Never Used   • Alcohol use No     No prescriptions prior to admission.     Allergies:  Venomil honey bee venom [honey bee venom] and Penicillins    REVIEW OF SYSTEMS:  Please see the above history of present illness for pertinent positives and negatives.  The remainder of the patient's systems have been reviewed and are negative.    Vital Signs            Physical Exam:  Physical Exam   Constitutional: Patient appears well-developed and well-nourished and in no acute distress   HEENT:   Head: Normocephalic and atraumatic.   Eyes:  Pupils are equal, round, and reactive to light.  Mouth and Throat: Patient has moist mucous membranes. Oropharynx is clear of any erythema or exudate.     Neck: Neck supple. No JVD present. No thyromegaly present. No lymphadenopathy present.  Cardiovascular: Regular rate, regular rhythm.  Pulmonary/Chest: Lungs are clear to auscultation bilaterally.  Abdominal:benign,soft with bowel sounds  Musculoskeletal: Normal posture.  Extremities:   Right wrist has a markedly positive Tinel's and Phalen's.  Good capillary refill.  No thenar atrophy at this time.  Skin is cool to touch  Neurological: Patient is alert and oriented.  Psychological:   Mood and behavior appropriate.  Skin: Skin is warm and dry.     Results Review:    I reviewed the patient's new clinical results.  Lab Results (most recent)     None          Imaging Results (most recent)     None          ECG/EMG Results (most recent)     None            Assessment/Plan  right carpal tunnel syndrome        I discussed the patients findings and my recommendations with patient and  Plan proceed with right carpal tunnel release    Chico Maddox MD  09/10/18  12:17 PM           none

## 2020-01-20 ENCOUNTER — HOSPITAL ENCOUNTER (OUTPATIENT)
Dept: PULMONOLOGY | Facility: HOSPITAL | Age: 55
Discharge: HOME | End: 2020-01-20
Admitting: INTERNAL MEDICINE

## 2020-01-20 ENCOUNTER — TRANSCRIBE ORDERS (OUTPATIENT)
Dept: ADMINISTRATIVE | Facility: HOSPITAL | Age: 55
End: 2020-01-20

## 2020-01-20 VITALS — OXYGEN SATURATION: 98 % | HEART RATE: 62 BPM | RESPIRATION RATE: 16 BRPM

## 2020-01-20 DIAGNOSIS — R06.00 DYSPNEA, UNSPECIFIED TYPE: Primary | ICD-10-CM

## 2020-01-20 LAB
BDY SITE: ABNORMAL
HGB BLDA-MCNC: 14.4 G/DL (ref 13.5–17.5)
SAO2 % BLDCOA: 93.8 % (ref 94–99)

## 2020-01-20 PROCEDURE — 94726 PLETHYSMOGRAPHY LUNG VOLUMES: CPT

## 2020-01-20 PROCEDURE — 94060 EVALUATION OF WHEEZING: CPT

## 2020-01-20 PROCEDURE — 94729 DIFFUSING CAPACITY: CPT

## 2020-01-20 PROCEDURE — 82820 HEMOGLOBIN-OXYGEN AFFINITY: CPT

## 2020-01-20 RX ORDER — ALBUTEROL SULFATE 2.5 MG/3ML
2.5 SOLUTION RESPIRATORY (INHALATION) ONCE
Status: COMPLETED | OUTPATIENT
Start: 2020-01-20 | End: 2020-01-20

## 2020-01-20 RX ADMIN — ALBUTEROL SULFATE 2.5 MG: 2.5 SOLUTION RESPIRATORY (INHALATION) at 11:46

## 2020-01-21 ENCOUNTER — TRANSCRIBE ORDERS (OUTPATIENT)
Dept: ADMINISTRATIVE | Facility: HOSPITAL | Age: 55
End: 2020-01-21

## 2020-01-21 DIAGNOSIS — R91.1 LUNG NODULE: Primary | ICD-10-CM

## 2020-02-03 ENCOUNTER — HOSPITAL ENCOUNTER (OUTPATIENT)
Dept: CT IMAGING | Facility: HOSPITAL | Age: 55
Discharge: HOME | End: 2020-02-03
Admitting: INTERNAL MEDICINE

## 2020-02-03 DIAGNOSIS — R91.1 LUNG NODULE: ICD-10-CM

## 2020-02-03 PROCEDURE — 71250 CT THORAX DX C-: CPT

## 2023-05-26 NOTE — PROGRESS NOTES
3 year colon repeat [facility] [address]  [address]  [facility] [address] [phone]      Santiago Jackson :2006 MRN:[id number]    2023 Time Session Began: 9:57 AM  Time Session Ended: 10:31 AM    Session Type: Therapy 16-37 minutes ([id number])  Others Present: none  Virtual visit  Clinician location: clinic  Patient location: school    Suicide/Homicide/Violence Ideation: No  If Yes, explain:     Need for Community Resources Assessed: Yes  Resources Needed: No  If Yes, what resources:     Current Outpatient Medications   Medication Sig   • FLUoxetine (PROzac) 20 MG capsule Take 1 capsule by mouth daily.   • acetaminophen (Tylenol Childrens) 160 MG/5ML suspension Take 20.3 mLs by mouth every 4 hours as needed for Pain.   • ibuprofen (Ibuprofen Childrens) 100 MG/5ML suspension Take 10 mLs by mouth every 4 hours as needed for Fever.   • Cetirizine HCl (ZYRTEC PO)    • loratadine (CLARITIN) 10 MG tablet Take 10 mg by mouth daily.     No current facility-administered medications for this visit.       Change in Medication(s) Reported: Yes    If Yes, explain: Patient is not currently taking medication    Patient/Family Education Provided: Yes  Patient/Family Displays Understanding: Yes  If No, explain:     Chief complaint in patient's own words: \"stress.\"    Progress Note containing chief complaint and symptoms/problems related to the complaint:    Data: Patient reported that she is feeling a little better today after completing a project yesterday. She is trying to be \"positive\" right now rather than \"go negative\" which she identified will \"ruin\" her weekend. She stated that her parents are having people over for the holiday and she's feeling unsure about that. She's feeling better about her grades and hopeful that her last project will bring up the last remaining \"D\" to a \"C\". She passed her driving test and plans to get her [employer] in the near future. She has also  been thinking about getting a new job, but worries that she'll make the change and regret it.      Action of the provider: Patient was provided with support. Patient's thoughts and feelings were processed and reframed to build insight. Cognitions shared by patient were acknowledged and exploration was encouraged. Discussed recent events and her response to them. Worked on problem solving and building hope for the future. Practiced challenging negative thinking and encouraged positive self-talk. Intervention: Behavioral, Cognitive, Supportive      Response of patient: Santiago was alert and oriented x4. Patient presented motivated. Patient presented as calm and cooperative. Mood appeared depressed with congruent affect. Eye contact was appropriate. Speech was normal in rate, tone, and volume. Motor activity was unremarkable. Thought process was future oriented and goal directed. Patient denied any suicidal ideation, homicidal ideation, or self-harm ideation.     Plan: Santiago  will return in 2-4 weeks or sooner if needed. Patient will practice skills discussed in session.    Diagnosis: Generalized Anxiety Disorder and Major Depression Recurrent : 2 Moderate    Treatment Plan:  See Treatment Plan     Discharge Plan: N/A     Next Appointment: 2-4 weeks  Candace Queen PSYD

## 2023-09-12 ENCOUNTER — OFFICE VISIT (OUTPATIENT)
Dept: CARDIOLOGY | Facility: CLINIC | Age: 58
End: 2023-09-12
Payer: COMMERCIAL

## 2023-09-12 VITALS
HEART RATE: 72 BPM | BODY MASS INDEX: 32.47 KG/M2 | HEIGHT: 66 IN | SYSTOLIC BLOOD PRESSURE: 117 MMHG | WEIGHT: 202 LBS | DIASTOLIC BLOOD PRESSURE: 74 MMHG

## 2023-09-12 DIAGNOSIS — R06.02 SHORTNESS OF BREATH: Primary | ICD-10-CM

## 2023-09-12 DIAGNOSIS — R07.2 PRECORDIAL PAIN: ICD-10-CM

## 2023-09-12 DIAGNOSIS — R42 DIZZINESS: ICD-10-CM

## 2023-09-12 DIAGNOSIS — R55 SYNCOPE AND COLLAPSE: ICD-10-CM

## 2023-09-12 RX ORDER — NITROGLYCERIN 0.4 MG/1
TABLET SUBLINGUAL
Qty: 100 TABLET | Refills: 11 | Status: SHIPPED | OUTPATIENT
Start: 2023-09-12

## 2023-09-12 RX ORDER — CHOLECALCIFEROL (VITAMIN D3) 25 MCG
1 TABLET ORAL DAILY
COMMUNITY
Start: 2023-08-25

## 2023-09-12 RX ORDER — METOPROLOL SUCCINATE 25 MG/1
25 TABLET, EXTENDED RELEASE ORAL DAILY
Qty: 30 TABLET | Refills: 11 | Status: SHIPPED | OUTPATIENT
Start: 2023-09-12

## 2023-09-12 NOTE — PROGRESS NOTES
PATIENT STATES SHE HAS A LOT OF SHORTNESS OF BREATH AND EXHAUSTED ALL THE TIME AND ALSO DIZZY WHICH CAUSED HER TO FAINT.    Subjective:        [facility]  Cardiology Consult Note    Patient Identification:  Name: Gisela Martinez  Age: 58 y.o.  Sex: female  :  1965  MRN: [id number]             CC  EXHAUSTED  TIGHTNESS IN CHEST  ON EXCERTION  F/H  SWEATS  History of Present Illness:   58-year-old female here for the cardiac evaluation as well as establishment of the care, remains exhausted, tightness in the chest on exertion, sweats    Comorbid cardiac risk factors:     Past Medical History:  Past Medical History:   Diagnosis Date    Adrenal adenoma     bilateral, having adrenal studies done     Anxiety     Arthritis     GERD (gastroesophageal reflux disease)     Goiter     27 years ago    Hyperlipidemia     Hypothyroidism (acquired)     Radiation at age 27 years    PUD (peptic ulcer disease)     Seasonal allergies     Spinal stenosis     Syncope      Past Surgical History:  Past Surgical History:   Procedure Laterality Date    ABDOMINAL SURGERY      Benign tumor on your left side    APPENDECTOMY      CARPAL TUNNEL RELEASE Right 2018    Procedure: CARPAL TUNNEL RELEASE;  Surgeon: Chico Maddox MD;  Location: [facility] OR;  Service: Orthopedics    COLONOSCOPY N/A 2018    Procedure: COLONOSCOPY and polypectomy;  Surgeon: Ankia Stephens DO;  Location: [facility] OR;  Service: Gastroenterology    COLONOSCOPY W/ POLYPECTOMY      ENDOSCOPY N/A 2018    Procedure: ESOPHAGOGASTRODUODENOSCOPY with ELLA test ;  Surgeon: Anika Stephens DO;  Location: [facility] OR;  Service: Gastroenterology    HYSTERECTOMY      LAPAROSCOPIC CHOLECYSTECTOMY      OTHER SURGICAL HISTORY      nodule removed from larynx    OTHER SURGICAL HISTORY      radiated thyroid    TUBAL ABDOMINAL LIGATION        Allergies:  Allergies   Allergen Reactions    Venomil Honey Bee Venom [Honey Bee Venom] Anaphylaxis     Penicillins Unknown (See Comments)     Anaphylaxis       Home Meds:  (Not in a hospital admission)    Current Meds:     Current Outpatient Medications:     cholecalciferol 25 MCG tablet, Take 1 tablet by mouth Daily., Disp: , Rfl:     citalopram (CeleXA) 20 MG tablet, Take 1 tablet by mouth Every Night., Disp: , Rfl:     levothyroxine (SYNTHROID, LEVOTHROID) 200 MCG tablet, Take 225 mcg by mouth Daily., Disp: , Rfl:     omeprazole (priLOSEC) 20 MG capsule, Take 1 capsule by mouth Daily., Disp: , Rfl:     atorvastatin (LIPITOR) 80 MG tablet, Take 1 tablet by mouth Every Night., Disp: , Rfl:     metoprolol succinate XL (TOPROL-XL) 25 MG 24 hr tablet, Take 1 tablet by mouth Daily., Disp: 30 tablet, Rfl: 11    nitroglycerin (NITROSTAT) 0.4 MG SL tablet, 1 under the tongue as needed for angina, may repeat q5mins for up three doses, Disp: 100 tablet, Rfl: 11  Social History:   Social History     Tobacco Use    Smoking status: Former     Packs/day: 1.00     Years: 20.00     Pack years: 20.00     Types: Cigarettes     Quit date:      Years since quittin.7    Smokeless tobacco: Never    Tobacco comments:     quit 4 days ago as of 21   Substance Use Topics    Alcohol use: Yes     Comment: social, once/year      Family History:  Family History   Problem Relation Age of Onset    Colon cancer Father     Heart disease Father     Colon polyps Sister     Malig Hyperthermia Neg Hx         Review of Systems    Constitutional: No weakness,fatigue, fever, rigors, chills   Eyes: No vision changes, eye pain   ENT/oropharynx: No difficulty swallowing, sore throat, epistaxis, changes in hearing   Cardiovascular: Chest tightness   Respiratory: No shortness of breath, dyspnea on exertion, cough, wheezing hemoptysis   Gastrointestinal: No abdominal pain, nausea, vomiting, diarrhea, bloody stools   Genitourinary: No hematuria, dysuria   Neurological: No headache, tremors, numbness,  one-sided weakness    Musculoskeletal: No  cramps, myalgias,  joint pain, joint swelling   Integument: No rash, edema           Constitutional:       Physical Exam   General:  Appears in no acute distress  Eyes: PERTL,  HEENT:  No JVD. Thyroid not visibly enlarged. No mucosal pallor or cyanosis  Respiratory: Respirations regular and unlabored at rest. BBS with good air entry in all fields. No crackles, rubs or wheezes auscultated  Cardiovascular: S1S2 Regular rate and rhythm. No murmur, rub or gallop auscultated. No carotid bruits. DP/PT pulses    . No pretibial pitting edema  Gastrointestinal: Abdomen soft, flat, non tender. Bowel sounds present. No hepatosplenomegaly. No ascites  Musculoskeletal: ESTES x4. No abnormal movements  Extremities: No digital clubbing or cyanosis  Skin: Color pink. Skin warm and dry to touch. No rashes  No xanthoma  Neuro: AAO x3 CN II-XII grossly intact            ECG 12 Lead    Date/Time: 9/12/2023 3:34 PM  Performed by: Nancy Sylvester MD  Authorized by: Nancy Sylvester MD   Comparison: not compared with previous ECG   Previous ECG: no previous ECG available  Rhythm: sinus rhythm    Clinical impression: non-specific ECG            Cardiographics  ECG:     Telemetry:    Echocardiogram:     Imaging  Chest X-ray:     Lab Review               @[email or web address]@              Assessment:/ Recommendations / Plan:   Patient Active Problem List   Diagnosis    Nausea    Bloating    Change in bowel habits    Other dysphagia    Right sided abdominal pain    Left sided abdominal pain    History of colon polyps    Carpal tunnel syndrome of right wrist    Abnormal nuclear stress test    Precordial pain    Abnormal nuclear stress test    Precordial pain    Shortness of breath    Syncope and collapse    Shortness of breath    Precordial pain    Dizziness    Syncope and collapse     Interpretation Summary         Left ventricular ejection fraction is normal (Calculated EF = 60%).    Myocardial perfusion imaging indicates a located in  the anterior wall, apex and septal wall.    Impressions are consistent with an intermediate risk study.    Findings consistent with an equivocal ECG stress test.               ICD-10-CM ICD-9-CM   1. Shortness of breath  R06.02 786.05   2. Precordial pain  R07.2 786.51   3. Dizziness  R42 780.4   4. Syncope and collapse  R55 780.2     1. Shortness of breath  Considering the patient's symptoms as well as clinical situation and  EKG findings, along with cardiac risk factors, ischemic workup is necessary to rule out ischemic cardiomyopathy, stress induced arrhythmias, and functional capacity for diagnosis as well as prognostic consideration    - Stress Test With Myocardial Perfusion One Day  - Adult Transthoracic Echo Complete W/ Cont if Necessary Per Protocol    2. Precordial pain  Considering patient's medical condition as well as the risk factors, patient will require echocardiogram for further evaluation for the LV function, four-chamber evaluation, including the pressures, valvular function and  pericardial disease and pericardial effusion    - Stress Test With Myocardial Perfusion One Day  - Adult Transthoracic Echo Complete W/ Cont if Necessary Per Protocol    3. Dizziness    - Stress Test With Myocardial Perfusion One Day  - Adult Transthoracic Echo Complete W/ Cont if Necessary Per Protocol    4. Syncope and collapse    - Stress Test With Myocardial Perfusion One Day  - Adult Transthoracic Echo Complete W/ Cont if Necessary Per Protocol      Pros and cons of ASA in primary and secondary prevention of CAD has been discussed.  Risks of bleeding and other possible side effects have been discussed, Diff advantages and disadvantages of 325 vs 81  Mg of ASA were discussed, at this stage it has been recommended to start ASA 81 mg /day   Prescriptions of the nitroglycerin and associated instructions has been given  Patient has been advised to use sublingual nitroglycerin for chest pain or equivalent symptoms, maximum  of 3 with the 10 minutes interval.  If the symptoms persist patient has been advised to go to emergency room  Due to the risk of the hypotension patient has been advised to take the sublingual nitroglycerin while the patient in sitting position    START TOPROL 12.5 MG    WILL NEED CATH  Labs/tests ordered for roger Sylvester MD  9/30/2023, 16:35 EDT      EMR Dragon/Transcription:   Dictated utilizing Dragon dictation

## 2023-09-21 ENCOUNTER — HOSPITAL ENCOUNTER (OUTPATIENT)
Dept: CARDIOLOGY | Facility: HOSPITAL | Age: 58
Discharge: HOME | End: 2023-09-21
Payer: COMMERCIAL

## 2023-09-21 ENCOUNTER — HOSPITAL ENCOUNTER (OUTPATIENT)
Dept: CARDIOLOGY | Facility: HOSPITAL | Age: 58
Discharge: HOME | End: 2023-09-21
Admitting: INTERNAL MEDICINE
Payer: COMMERCIAL

## 2023-09-21 ENCOUNTER — OFFICE VISIT (OUTPATIENT)
Dept: CARDIOLOGY | Facility: CLINIC | Age: 58
End: 2023-09-21
Payer: COMMERCIAL

## 2023-09-21 VITALS
HEART RATE: 50 BPM | BODY MASS INDEX: 32.24 KG/M2 | SYSTOLIC BLOOD PRESSURE: 125 MMHG | WEIGHT: 200.62 LBS | HEIGHT: 66 IN | DIASTOLIC BLOOD PRESSURE: 76 MMHG

## 2023-09-21 VITALS
BODY MASS INDEX: 32.14 KG/M2 | OXYGEN SATURATION: 99 % | WEIGHT: 200 LBS | HEIGHT: 66 IN | SYSTOLIC BLOOD PRESSURE: 109 MMHG | DIASTOLIC BLOOD PRESSURE: 74 MMHG | HEART RATE: 54 BPM

## 2023-09-21 VITALS
DIASTOLIC BLOOD PRESSURE: 82 MMHG | HEIGHT: 66 IN | SYSTOLIC BLOOD PRESSURE: 125 MMHG | BODY MASS INDEX: 32.47 KG/M2 | HEART RATE: 59 BPM | WEIGHT: 202 LBS

## 2023-09-21 DIAGNOSIS — R55 SYNCOPE AND COLLAPSE: ICD-10-CM

## 2023-09-21 DIAGNOSIS — R07.2 PRECORDIAL PAIN: ICD-10-CM

## 2023-09-21 DIAGNOSIS — R94.39 ABNORMAL NUCLEAR STRESS TEST: Primary | ICD-10-CM

## 2023-09-21 DIAGNOSIS — R06.02 SHORTNESS OF BREATH: ICD-10-CM

## 2023-09-21 LAB
AORTIC DIMENSIONLESS INDEX: 0.83 (DI)
ASCENDING AORTA: 3.4 CM
BH CV ECHO MEAS - ACS: 2 CM
BH CV ECHO MEAS - AO MAX PG: 7.9 MMHG
BH CV ECHO MEAS - AO MEAN PG: 4 MMHG
BH CV ECHO MEAS - AO ROOT DIAM: 3.2 CM
BH CV ECHO MEAS - AO V2 MAX: 140.7 CM/SEC
BH CV ECHO MEAS - AO V2 VTI: 32.9 CM
BH CV ECHO MEAS - AVA(I,D): 2.6 CM2
BH CV ECHO MEAS - EDV(CUBED): 55.7 ML
BH CV ECHO MEAS - EDV(MOD-SP2): 115 ML
BH CV ECHO MEAS - EDV(MOD-SP4): 108 ML
BH CV ECHO MEAS - EF(MOD-BP): 63.7 %
BH CV ECHO MEAS - EF(MOD-SP2): 63.4 %
BH CV ECHO MEAS - EF(MOD-SP4): 65.9 %
BH CV ECHO MEAS - ESV(CUBED): 22 ML
BH CV ECHO MEAS - ESV(MOD-SP2): 42.1 ML
BH CV ECHO MEAS - ESV(MOD-SP4): 36.8 ML
BH CV ECHO MEAS - FS: 26.7 %
BH CV ECHO MEAS - IVS/LVPW: 0.96 CM
BH CV ECHO MEAS - IVSD: 1.08 CM
BH CV ECHO MEAS - LAT PEAK E' VEL: 12.6 CM/SEC
BH CV ECHO MEAS - LV MASS(C)D: 136.6 GRAMS
BH CV ECHO MEAS - LV MAX PG: 5.4 MMHG
BH CV ECHO MEAS - LV MEAN PG: 2 MMHG
BH CV ECHO MEAS - LV V1 MAX: 116 CM/SEC
BH CV ECHO MEAS - LV V1 VTI: 27.1 CM
BH CV ECHO MEAS - LVIDD: 3.8 CM
BH CV ECHO MEAS - LVIDS: 2.8 CM
BH CV ECHO MEAS - LVOT AREA: 3.1 CM2
BH CV ECHO MEAS - LVOT DIAM: 2 CM
BH CV ECHO MEAS - LVPWD: 1.13 CM
BH CV ECHO MEAS - MED PEAK E' VEL: 11.5 CM/SEC
BH CV ECHO MEAS - MR MAX PG: 82.4 MMHG
BH CV ECHO MEAS - MR MAX VEL: 454 CM/SEC
BH CV ECHO MEAS - MV A MAX VEL: 84.4 CM/SEC
BH CV ECHO MEAS - MV DEC SLOPE: 474 CM/SEC2
BH CV ECHO MEAS - MV DEC TIME: 0.24 SEC
BH CV ECHO MEAS - MV E MAX VEL: 89.2 CM/SEC
BH CV ECHO MEAS - MV E/A: 1.06
BH CV ECHO MEAS - MV MAX PG: 5.3 MMHG
BH CV ECHO MEAS - MV MEAN PG: 1 MMHG
BH CV ECHO MEAS - MV P1/2T: 72.9 MSEC
BH CV ECHO MEAS - MV V2 VTI: 40.1 CM
BH CV ECHO MEAS - MVA(P1/2T): 3 CM2
BH CV ECHO MEAS - MVA(VTI): 2.12 CM2
BH CV ECHO MEAS - PA ACC TIME: 0.16 SEC
BH CV ECHO MEAS - PA V2 MAX: 84.6 CM/SEC
BH CV ECHO MEAS - PI END-D VEL: 57 CM/SEC
BH CV ECHO MEAS - PULM A REVS DUR: 0.14 SEC
BH CV ECHO MEAS - PULM A REVS VEL: 31 CM/SEC
BH CV ECHO MEAS - PULM DIAS VEL: 33 CM/SEC
BH CV ECHO MEAS - PULM S/D: 0.98
BH CV ECHO MEAS - PULM SYS VEL: 32.5 CM/SEC
BH CV ECHO MEAS - QP/QS: 0.65
BH CV ECHO MEAS - RAP SYSTOLE: 8 MMHG
BH CV ECHO MEAS - RV MAX PG: 1.54 MMHG
BH CV ECHO MEAS - RV V1 MAX: 62.1 CM/SEC
BH CV ECHO MEAS - RV V1 VTI: 15.9 CM
BH CV ECHO MEAS - RVOT DIAM: 2.1 CM
BH CV ECHO MEAS - RVSP: 22 MMHG
BH CV ECHO MEAS - SV(LVOT): 85.1 ML
BH CV ECHO MEAS - SV(MOD-SP2): 72.9 ML
BH CV ECHO MEAS - SV(MOD-SP4): 71.2 ML
BH CV ECHO MEAS - SV(RVOT): 55.1 ML
BH CV ECHO MEAS - TAPSE (>1.6): 3.2 CM
BH CV ECHO MEAS - TR MAX PG: 14 MMHG
BH CV ECHO MEAS - TR MAX VEL: 184.8 CM/SEC
BH CV ECHO MEASUREMENTS AVERAGE E/E' RATIO: 7.4
BH CV REST NUCLEAR ISOTOPE DOSE: 10.9 MCI
BH CV STRESS BP STAGE 1: NORMAL
BH CV STRESS COMMENTS STAGE 1: NORMAL
BH CV STRESS DOSE REGADENOSON STAGE 1: 0.4
BH CV STRESS DURATION MIN STAGE 1: 1
BH CV STRESS DURATION SEC STAGE 1: 0
BH CV STRESS HR STAGE 1: 92
BH CV STRESS NUCLEAR ISOTOPE DOSE: 32.8 MCI
BH CV STRESS PROTOCOL 1: NORMAL
BH CV STRESS RECOVERY BP: NORMAL MMHG
BH CV STRESS RECOVERY HR: 76 BPM
BH CV STRESS RECOVERY O2: 99 %
BH CV STRESS STAGE 1: 1
BH CV XLRA - RV BASE: 2.8 CM
BH CV XLRA - RV LENGTH: 8 CM
BH CV XLRA - RV MID: 2.39 CM
BH CV XLRA - TDI S': 11.4 CM/SEC
LEFT ATRIUM VOLUME INDEX: 21.1 ML/M2
LV EF NUC BP: 60 %
MAXIMAL PREDICTED HEART RATE: 163 BPM
PERCENT MAX PREDICTED HR: 56.44 %
SINUS: 3.4 CM
STJ: 2.9 CM
STRESS BASELINE BP: NORMAL MMHG
STRESS BASELINE HR: 54 BPM
STRESS O2 SAT REST: 99 %
STRESS PERCENT HR: 66 %
STRESS POST ESTIMATED WORKLOAD: 1 METS
STRESS POST EXERCISE DUR MIN: 1 MIN
STRESS POST EXERCISE DUR SEC: 0 SEC
STRESS POST PEAK BP: NORMAL MMHG
STRESS POST PEAK HR: 92 BPM
STRESS TARGET HR: 139 BPM

## 2023-09-21 PROCEDURE — 78452 HT MUSCLE IMAGE SPECT MULT: CPT

## 2023-09-21 PROCEDURE — 25010000002 REGADENOSON 0.4 MG/5ML SOLUTION: Performed by: INTERNAL MEDICINE

## 2023-09-21 PROCEDURE — 0 TECHNETIUM SESTAMIBI: Performed by: INTERNAL MEDICINE

## 2023-09-21 PROCEDURE — 93306 TTE W/DOPPLER COMPLETE: CPT | Performed by: INTERNAL MEDICINE

## 2023-09-21 PROCEDURE — A9500 TC99M SESTAMIBI: HCPCS | Performed by: INTERNAL MEDICINE

## 2023-09-21 PROCEDURE — 93306 TTE W/DOPPLER COMPLETE: CPT

## 2023-09-21 PROCEDURE — 93017 CV STRESS TEST TRACING ONLY: CPT

## 2023-09-21 PROCEDURE — 25510000001 PERFLUTREN (DEFINITY) 8.476 MG IN SODIUM CHLORIDE (PF) 0.9 % 10 ML INJECTION: Performed by: INTERNAL MEDICINE

## 2023-09-21 RX ORDER — ATORVASTATIN CALCIUM 80 MG/1
80 TABLET, FILM COATED ORAL NIGHTLY
COMMUNITY
Start: 2023-09-14

## 2023-09-21 RX ORDER — REGADENOSON 0.08 MG/ML
0.4 INJECTION, SOLUTION INTRAVENOUS
Status: COMPLETED | OUTPATIENT
Start: 2023-09-21 | End: 2023-09-21

## 2023-09-21 RX ADMIN — REGADENOSON 0.4 MG: 0.08 INJECTION, SOLUTION INTRAVENOUS at 09:14

## 2023-09-21 RX ADMIN — SODIUM CHLORIDE 3 ML: 9 INJECTION INTRAMUSCULAR; INTRAVENOUS; SUBCUTANEOUS at 07:47

## 2023-09-21 RX ADMIN — TECHNETIUM TC 99M SESTAMIBI 1 DOSE: 1 INJECTION INTRAVENOUS at 07:09

## 2023-09-21 RX ADMIN — TECHNETIUM TC 99M SESTAMIBI 1 DOSE: 1 INJECTION INTRAVENOUS at 09:14

## 2023-09-21 NOTE — PROGRESS NOTES
Results   Subjective:        Gisela Martinez is a 58 y.o. female who here for follow up    CC  Cp with few ntg  HPI  58-year-old female has been having chest pains using significant nitroglycerin had a stress test which is abnormal     Problems Addressed this Visit          Cardiac and Vasculature    Abnormal nuclear stress test - Primary    Relevant Orders    Case Request Cath Lab: Left Heart Cath (Completed)    CBC & Differential    Basic Metabolic Panel    aPTT    Protime-INR    Precordial pain    Relevant Orders    Case Request Cath Lab: Left Heart Cath (Completed)    CBC & Differential    Basic Metabolic Panel    aPTT    Protime-INR       Pulmonary and Pneumonias    Shortness of breath       Symptoms and Signs    Syncope and collapse     Diagnoses         Codes Comments    Abnormal nuclear stress test    -  Primary ICD-10-CM: R94.39  ICD-9-CM: 794.39     Precordial pain     ICD-10-CM: R07.2  ICD-9-CM: 786.51     Shortness of breath     ICD-10-CM: R06.02  ICD-9-CM: 786.05     Syncope and collapse     ICD-10-CM: R55  ICD-9-CM: 780.2           .    The following portions of the patient's history were reviewed and updated as appropriate: allergies, current medications, past family history, past medical history, past social history, past surgical history and problem list.    Past Medical History:   Diagnosis Date    Adrenal adenoma     bilateral, having adrenal studies done     Anxiety     Arthritis     GERD (gastroesophageal reflux disease)     Goiter     27 years ago    Hyperlipidemia     Hypothyroidism (acquired)     Radiation at age 27 years    PUD (peptic ulcer disease)     Seasonal allergies     Spinal stenosis     Syncope      reports that she quit smoking about 20 months ago. Her smoking use included cigarettes. She has a 20.00 pack-year smoking history. She has never used smokeless tobacco. She reports current alcohol use. She reports that she does not use drugs.   Family History   Problem Relation  "Age of Onset    Colon cancer Father     Heart disease Father     Colon polyps Sister     Malig Hyperthermia Neg Hx        Review of Systems  Constitutional: No wt loss, fever, fatigue  Gastrointestinal: No nausea, abdominal pain  Behavioral/Psych: No insomnia or anxiety   Cardiovascular chest pain  Objective:       Physical Exam           Physical Exam  /82 (BP Location: Left arm, Patient Position: Sitting, Cuff Size: Adult)   Pulse 59   Ht 167.6 cm (66\")   Wt 91.6 kg (202 lb)   BMI 32.60 kg/m²     General appearance: No acute changes   Eyes: Sclerae conjunctivae normal, pupils reactive   HENT: Atraumatic; oropharynx clear with moist mucous membranes and no mucosal ulcerations;  Neck: Trachea midline; NECK, supple, no thyromegaly or lymphadenopathy   Lungs: Normal size and shape, normal breath sounds, equal distribution of air, no rales and rhonchi   CV: S1-S2 regular, no murmurs, no rub, no gallop   Abdomen: Soft, nontender; no masses , no abnormal abdominal sounds   Extremities: No deformity , normal color , no peripheral edema   Skin: Normal temperature, turgor and texture; no rash, ulcers  Psych: Appropriate affect, alert and oriented to person, place and time           Procedures      Echocardiogram:        Current Outpatient Medications:     atorvastatin (LIPITOR) 80 MG tablet, Take 1 tablet by mouth Every Night., Disp: , Rfl:     cholecalciferol 25 MCG tablet, Take 1 tablet by mouth Daily., Disp: , Rfl:     citalopram (CeleXA) 20 MG tablet, Take 1 tablet by mouth Every Night., Disp: , Rfl:     levothyroxine (SYNTHROID, LEVOTHROID) 200 MCG tablet, Take 225 mcg by mouth Daily., Disp: , Rfl:     metoprolol succinate XL (TOPROL-XL) 25 MG 24 hr tablet, Take 1 tablet by mouth Daily., Disp: 30 tablet, Rfl: 11    nitroglycerin (NITROSTAT) 0.4 MG SL tablet, 1 under the tongue as needed for angina, may repeat q5mins for up three doses, Disp: 100 tablet, Rfl: 11    omeprazole (priLOSEC) 20 MG capsule, Take 1 " capsule by mouth Daily., Disp: , Rfl:    Assessment:        Patient Active Problem List   Diagnosis    Nausea    Bloating    Change in bowel habits    Other dysphagia    Right sided abdominal pain    Left sided abdominal pain    History of colon polyps    Carpal tunnel syndrome of right wrist    Abnormal nuclear stress test    Precordial pain    Abnormal nuclear stress test    Precordial pain    Shortness of breath    Syncope and collapse               Plan:            ICD-10-CM ICD-9-CM   1. Abnormal nuclear stress test  R94.39 794.39   2. Precordial pain  R07.2 786.51   3. Shortness of breath  R06.02 786.05   4. Syncope and collapse  R55 780.2     1. Abnormal nuclear stress test  Needs further work-up  - Case Request Cath Lab: Left Heart Cath  - CBC & Differential; Future  - Basic Metabolic Panel; Future  - aPTT; Future  - Protime-INR; Future    2. Precordial pain  Needs further work-up  - Case Request Cath Lab: Left Heart Cath  - CBC & Differential; Future  - Basic Metabolic Panel; Future  - aPTT; Future  - Protime-INR; Future    3. Shortness of breath  Multifactorial    4. Syncope and collapse  No more syncope      Procedure, risks and options of cardiac cath explained to pt INCLUDING BUT NOT LIMITED TO MI, STROKE, DEATH, INFECTION HAEMORRHAGE, . Pt understands well and agrees with no further questions.    COUNSELING:    Gisela Ridley was given to patient for the following topics: diagnostic results, risk factor reductions, impressions, risks and benefits of treatment options and importance of treatment compliance .       SMOKING COUNSELING:        Dictated using Dragon dictation

## 2023-09-24 PROBLEM — R94.39 ABNORMAL NUCLEAR STRESS TEST: Status: ACTIVE | Noted: 2023-09-24

## 2023-09-24 PROBLEM — R55 SYNCOPE AND COLLAPSE: Status: ACTIVE | Noted: 2023-09-24

## 2023-09-24 PROBLEM — R06.02 SHORTNESS OF BREATH: Status: ACTIVE | Noted: 2023-09-24

## 2023-09-24 PROBLEM — R07.2 PRECORDIAL PAIN: Status: ACTIVE | Noted: 2023-09-24

## 2023-09-30 PROBLEM — R06.02 SHORTNESS OF BREATH: Status: ACTIVE | Noted: 2023-09-30

## 2023-09-30 PROBLEM — R55 SYNCOPE AND COLLAPSE: Status: ACTIVE | Noted: 2023-09-30

## 2023-09-30 PROBLEM — R42 DIZZINESS: Status: ACTIVE | Noted: 2023-09-30

## 2023-09-30 PROBLEM — R07.2 PRECORDIAL PAIN: Status: ACTIVE | Noted: 2023-09-30

## 2023-10-04 ENCOUNTER — LAB (OUTPATIENT)
Dept: LAB | Facility: HOSPITAL | Age: 58
End: 2023-10-04
Payer: COMMERCIAL

## 2023-10-04 DIAGNOSIS — R07.2 PRECORDIAL PAIN: ICD-10-CM

## 2023-10-04 DIAGNOSIS — R94.39 ABNORMAL NUCLEAR STRESS TEST: ICD-10-CM

## 2023-10-04 LAB
ANION GAP SERPL CALCULATED.3IONS-SCNC: 11 MMOL/L (ref 5–15)
APTT PPP: 28.7 SECONDS (ref 24.3–38.1)
BASOPHILS # BLD AUTO: 0.12 10*3/MM3 (ref 0–0.2)
BASOPHILS NFR BLD AUTO: 1 % (ref 0–1.5)
BUN SERPL-MCNC: 18 MG/DL (ref 6–20)
BUN/CREAT SERPL: 18.8 (ref 7–25)
CALCIUM SPEC-SCNC: 9.6 MG/DL (ref 8.6–10.5)
CHLORIDE SERPL-SCNC: 104 MMOL/L (ref 98–107)
CO2 SERPL-SCNC: 26 MMOL/L (ref 22–29)
CREAT SERPL-MCNC: 0.96 MG/DL (ref 0.57–1)
DEPRECATED RDW RBC AUTO: 40.2 FL (ref 37–54)
EGFRCR SERPLBLD CKD-EPI 2021: 69.2 ML/MIN/1.73
EOSINOPHIL # BLD AUTO: 0.28 10*3/MM3 (ref 0–0.4)
EOSINOPHIL NFR BLD AUTO: 2.2 % (ref 0.3–6.2)
ERYTHROCYTE [DISTWIDTH] IN BLOOD BY AUTOMATED COUNT: 13 % (ref 12.3–15.4)
GLUCOSE SERPL-MCNC: 133 MG/DL (ref 65–99)
HCT VFR BLD AUTO: 44.2 % (ref 34–46.6)
HGB BLD-MCNC: 14.3 G/DL (ref 12–15.9)
IMM GRANULOCYTES # BLD AUTO: 0.14 10*3/MM3 (ref 0–0.05)
IMM GRANULOCYTES NFR BLD AUTO: 1.1 % (ref 0–0.5)
INR PPP: 0.88 (ref 0.9–1.1)
LYMPHOCYTES # BLD AUTO: 4.76 10*3/MM3 (ref 0.7–3.1)
LYMPHOCYTES NFR BLD AUTO: 38 % (ref 19.6–45.3)
MCH RBC QN AUTO: 27.6 PG (ref 26.6–33)
MCHC RBC AUTO-ENTMCNC: 32.4 G/DL (ref 31.5–35.7)
MCV RBC AUTO: 85.2 FL (ref 79–97)
MONOCYTES # BLD AUTO: 0.72 10*3/MM3 (ref 0.1–0.9)
MONOCYTES NFR BLD AUTO: 5.7 % (ref 5–12)
NEUTROPHILS NFR BLD AUTO: 52 % (ref 42.7–76)
NEUTROPHILS NFR BLD AUTO: 6.52 10*3/MM3 (ref 1.7–7)
NRBC BLD AUTO-RTO: 0 /100 WBC (ref 0–0.2)
PLATELET # BLD AUTO: 373 10*3/MM3 (ref 140–450)
PMV BLD AUTO: 10.6 FL (ref 6–12)
POTASSIUM SERPL-SCNC: 4 MMOL/L (ref 3.5–5.2)
PROTHROMBIN TIME: 11.9 SECONDS (ref 12.1–15)
RBC # BLD AUTO: 5.19 10*6/MM3 (ref 3.77–5.28)
SODIUM SERPL-SCNC: 141 MMOL/L (ref 136–145)
WBC NRBC COR # BLD: 12.54 10*3/MM3 (ref 3.4–10.8)

## 2023-10-04 PROCEDURE — 85610 PROTHROMBIN TIME: CPT

## 2023-10-04 PROCEDURE — 85730 THROMBOPLASTIN TIME PARTIAL: CPT

## 2023-10-04 PROCEDURE — 85025 COMPLETE CBC W/AUTO DIFF WBC: CPT

## 2023-10-04 PROCEDURE — 80048 BASIC METABOLIC PNL TOTAL CA: CPT

## 2023-10-06 ENCOUNTER — HOSPITAL ENCOUNTER (OUTPATIENT)
Facility: HOSPITAL | Age: 58
Setting detail: HOSPITAL OUTPATIENT SURGERY
Discharge: HOME | End: 2023-10-06
Attending: INTERNAL MEDICINE | Admitting: INTERNAL MEDICINE
Payer: COMMERCIAL

## 2023-10-06 VITALS
WEIGHT: 200 LBS | TEMPERATURE: 97.1 F | HEART RATE: 56 BPM | RESPIRATION RATE: 18 BRPM | OXYGEN SATURATION: 97 % | BODY MASS INDEX: 32.14 KG/M2 | DIASTOLIC BLOOD PRESSURE: 73 MMHG | SYSTOLIC BLOOD PRESSURE: 145 MMHG | HEIGHT: 66 IN

## 2023-10-06 DIAGNOSIS — R94.39 ABNORMAL NUCLEAR STRESS TEST: ICD-10-CM

## 2023-10-06 DIAGNOSIS — R07.2 PRECORDIAL PAIN: ICD-10-CM

## 2023-10-06 PROCEDURE — C1894 INTRO/SHEATH, NON-LASER: HCPCS | Performed by: INTERNAL MEDICINE

## 2023-10-06 PROCEDURE — 25010000002 FENTANYL CITRATE (PF) 50 MCG/ML SOLUTION: Performed by: INTERNAL MEDICINE

## 2023-10-06 PROCEDURE — C1769 GUIDE WIRE: HCPCS | Performed by: INTERNAL MEDICINE

## 2023-10-06 PROCEDURE — 25510000001 IOPAMIDOL PER 1 ML: Performed by: INTERNAL MEDICINE

## 2023-10-06 PROCEDURE — 93458 L HRT ARTERY/VENTRICLE ANGIO: CPT | Performed by: INTERNAL MEDICINE

## 2023-10-06 PROCEDURE — 25010000002 HEPARIN (PORCINE) PER 1000 UNITS: Performed by: INTERNAL MEDICINE

## 2023-10-06 PROCEDURE — 25010000002 MIDAZOLAM PER 1 MG: Performed by: INTERNAL MEDICINE

## 2023-10-06 PROCEDURE — 25810000003 SODIUM CHLORIDE 0.9 % SOLUTION: Performed by: INTERNAL MEDICINE

## 2023-10-06 RX ORDER — VERAPAMIL HYDROCHLORIDE 2.5 MG/ML
INJECTION, SOLUTION INTRAVENOUS
Status: DISCONTINUED | OUTPATIENT
Start: 2023-10-06 | End: 2023-10-06 | Stop reason: HOSPADM

## 2023-10-06 RX ORDER — FENTANYL CITRATE 50 UG/ML
INJECTION, SOLUTION INTRAMUSCULAR; INTRAVENOUS
Status: DISCONTINUED | OUTPATIENT
Start: 2023-10-06 | End: 2023-10-06 | Stop reason: HOSPADM

## 2023-10-06 RX ORDER — NITROGLYCERIN 0.4 MG/1
0.4 TABLET SUBLINGUAL
Status: CANCELLED | OUTPATIENT
Start: 2023-10-06

## 2023-10-06 RX ORDER — SODIUM CHLORIDE 0.9 % (FLUSH) 0.9 %
10 SYRINGE (ML) INJECTION EVERY 12 HOURS SCHEDULED
Status: DISCONTINUED | OUTPATIENT
Start: 2023-10-06 | End: 2023-10-06 | Stop reason: HOSPADM

## 2023-10-06 RX ORDER — SODIUM CHLORIDE 9 MG/ML
75 INJECTION, SOLUTION INTRAVENOUS CONTINUOUS
Status: DISCONTINUED | OUTPATIENT
Start: 2023-10-06 | End: 2023-10-06 | Stop reason: HOSPADM

## 2023-10-06 RX ORDER — ACETAMINOPHEN 325 MG/1
650 TABLET ORAL EVERY 4 HOURS PRN
Status: CANCELLED | OUTPATIENT
Start: 2023-10-06

## 2023-10-06 RX ORDER — HEPARIN SODIUM 1000 [USP'U]/ML
INJECTION, SOLUTION INTRAVENOUS; SUBCUTANEOUS
Status: DISCONTINUED | OUTPATIENT
Start: 2023-10-06 | End: 2023-10-06 | Stop reason: HOSPADM

## 2023-10-06 RX ORDER — SODIUM CHLORIDE 0.9 % (FLUSH) 0.9 %
10 SYRINGE (ML) INJECTION AS NEEDED
Status: DISCONTINUED | OUTPATIENT
Start: 2023-10-06 | End: 2023-10-06 | Stop reason: HOSPADM

## 2023-10-06 RX ORDER — MIDAZOLAM HYDROCHLORIDE 1 MG/ML
INJECTION INTRAMUSCULAR; INTRAVENOUS
Status: DISCONTINUED | OUTPATIENT
Start: 2023-10-06 | End: 2023-10-06 | Stop reason: HOSPADM

## 2023-10-06 RX ORDER — LIDOCAINE HYDROCHLORIDE 20 MG/ML
INJECTION, SOLUTION INFILTRATION; PERINEURAL
Status: DISCONTINUED | OUTPATIENT
Start: 2023-10-06 | End: 2023-10-06 | Stop reason: HOSPADM

## 2023-10-06 RX ADMIN — SODIUM CHLORIDE 75 ML/HR: 9 INJECTION, SOLUTION INTRAVENOUS at 08:40

## 2023-10-06 NOTE — DISCHARGE INSTRUCTIONS
[facility]  [address] [place] [address]    Coronary Angiogram (Radial/Ulnar Approach) After Care    Refer to this sheet in the next few weeks. These instructions provide you with information on caring for yourself after your procedure. Your caregiver may also give you more specific instructions. Your treatment has been planned according to current medical practices, but problems sometimes occur. Call your caregiver if you have any problems or questions after your procedure.    Home Care Instructions:  You may shower the day after the procedure. Remove the bandage (dressing) and gently wash the site with plain soap and water. Gently pat the site dry. You may apply a band aid daily for 2 days if desired.    Do not apply powder or lotion to the site.  Do not submerge the affected site in water for 3 to 5 days or until the site is completely healed.   Do not lift, push or pull anything over 5 pounds for 5 days after your procedure or as directed by your physician.  As a reference, a gallon of milk weighs 8 pounds.   Inspect the site at least twice daily. You may notice some bruising at the site and it may be tender for 1 to 2 weeks.     Increase your fluid intake for the next 2 days.    Keep arm elevated for 24 hours. For the remainder of the day, keep your arm in “Pledge of Allegiance” position when up and about.     You may drive 24 hours after the procedure unless otherwise instructed by your caregiver.  Do not operate machinery or power tools for 24 hours.  A responsible adult should be with you for the first 24 hours after you arrive home. Do not make any important legal decisions or sign legal papers for 24 hours.  Do not drink alcohol for 24 hours.    Metformin or any medications containing Metformin should not be taken for 48 hours after your procedure.      Call Your Doctor if:   You have unusual pain at the radial/ulnar (wrist) site.  You have redness, warmth, swelling, or pain at the  radial/ulnar (wrist) site.  You have drainage (other than a small amount of blood on the dressing).  `You have chills or a fever > 101.  Your arm becomes pale or dark, cool, tingly, or numb.  You develop chest pain, shortness of breath, feel faint or pass out.    You have heavy bleeding from the site, hold pressure on the site for 20 minutes.  If the bleeding stops, apply a fresh bandage and call your cardiologist.  However, if you        continue to have bleeding, call 911 and continue to apply pressure to the site.   You have any symptoms of a stroke.  Remember BE FAST  B-balance. Sudden trouble walking or loss of balance.  E-eyes.  Sudden changes in how you see or a sudden onset of a very bad headache.   F-face. Sudden weakness or loss of feeling of the face or facial droop on one side.   A-arms Sudden weakness or numbness in one arm.  One arm drifts down if they are both held out in front of you. This happens suddenly and usually on one side of the body.   S-speech.  Sudden trouble speaking, slurred speech or trouble understanding what are saying.   T-time  Time to call emergency services.  Write down the symptoms and the time they started.

## 2023-10-06 NOTE — LETTER
October 6, 2023     Patient:    Date of Birth:    Date of Visit:        To Whom It May Concern:    Mr. Mike Martinez was present at the hospital with his spouse today 10/06/2023, that is his reason for not being at work.           Sincerely,    [facility]    No name on file    CC:   No Recipients

## 2023-11-14 ENCOUNTER — OFFICE VISIT (OUTPATIENT)
Dept: CARDIOLOGY | Facility: CLINIC | Age: 58
End: 2023-11-14
Payer: COMMERCIAL

## 2023-11-14 VITALS
HEART RATE: 63 BPM | OXYGEN SATURATION: 97 % | HEIGHT: 66 IN | BODY MASS INDEX: 34.23 KG/M2 | WEIGHT: 213 LBS | SYSTOLIC BLOOD PRESSURE: 112 MMHG | DIASTOLIC BLOOD PRESSURE: 70 MMHG

## 2023-11-14 DIAGNOSIS — R42 DIZZINESS: ICD-10-CM

## 2023-11-14 DIAGNOSIS — R07.2 PRECORDIAL PAIN: Primary | ICD-10-CM

## 2023-11-14 DIAGNOSIS — R06.02 SHORTNESS OF BREATH: ICD-10-CM

## 2023-11-14 DIAGNOSIS — R55 SYNCOPE AND COLLAPSE: ICD-10-CM

## 2023-11-14 PROCEDURE — 99213 OFFICE O/P EST LOW 20 MIN: CPT | Performed by: INTERNAL MEDICINE

## 2023-11-14 RX ORDER — DULOXETIN HYDROCHLORIDE 30 MG/1
30 CAPSULE, DELAYED RELEASE ORAL DAILY
COMMUNITY
Start: 2023-11-11

## 2023-11-14 NOTE — PROGRESS NOTES
CATH F/U   Subjective:        Gisela Martinez is a 58 y.o. female who here for follow up    CC  Follow-up from the heart catheter  HPI  58-year-old female with atypical chest pain shortness of breath syncope and dizziness here for the follow-up after recently underwent diagnostic heart catheter     Problems Addressed this Visit          Cardiac and Vasculature    Precordial pain - Primary       Pulmonary and Pneumonias    Shortness of breath       Symptoms and Signs    Syncope and collapse    Dizziness     Diagnoses         Codes Comments    Precordial pain    -  Primary ICD-10-CM: R07.2  ICD-9-CM: 786.51     Shortness of breath     ICD-10-CM: R06.02  ICD-9-CM: 786.05     Syncope and collapse     ICD-10-CM: R55  ICD-9-CM: 780.2     Dizziness     ICD-10-CM: R42  ICD-9-CM: 780.4           .    The following portions of the patient's history were reviewed and updated as appropriate: allergies, current medications, past family history, past medical history, past social history, past surgical history and problem list.    Past Medical History:   Diagnosis Date    Adrenal adenoma     bilateral, having adrenal studies done     Anxiety     Arthritis     GERD (gastroesophageal reflux disease)     Goiter     27 years ago    Hyperlipidemia     Hypothyroidism (acquired)     Radiation at age 27 years    PUD (peptic ulcer disease)     Seasonal allergies     Spinal stenosis     Syncope      reports that she quit smoking about 10 months ago. Her smoking use included cigarettes. She has a 20.00 pack-year smoking history. She has never used smokeless tobacco. She reports current alcohol use. She reports that she does not use drugs.   Family History   Problem Relation Age of Onset    Colon cancer Father     Heart disease Father     Colon polyps Sister     Malig Hyperthermia Neg Hx        Review of Systems  Constitutional: No wt loss, fever, fatigue  Gastrointestinal: No nausea, abdominal pain  Behavioral/Psych: No insomnia or  "anxiety   Cardiovascular no chest pains or tightness in the chest  Objective:       Physical Exam           Physical Exam  /70   Pulse 63   Ht 167.6 cm (66\")   Wt 96.6 kg (213 lb)   SpO2 97%   BMI 34.38 kg/m²     General appearance: No acute changes   Eyes: Sclerae conjunctivae normal, pupils reactive   HENT: Atraumatic; oropharynx clear with moist mucous membranes and no mucosal ulcerations;  Neck: Trachea midline; NECK, supple, no thyromegaly or lymphadenopathy   Lungs: Normal size and shape, normal breath sounds, equal distribution of air, no rales and rhonchi   CV: S1-S2 regular, no murmurs, no rub, no gallop   Abdomen: Soft, nontender; no masses , no abnormal abdominal sounds   Extremities: No deformity , normal color , no peripheral edema   Skin: Normal temperature, turgor and texture; no rash, ulcers  Psych: Appropriate affect, alert and oriented to person, place and time           Procedures      Echocardiogram:        Current Outpatient Medications:     atorvastatin (LIPITOR) 80 MG tablet, Take 1 tablet by mouth Every Night., Disp: , Rfl:     cholecalciferol 25 MCG tablet, Take 1 tablet by mouth Daily., Disp: , Rfl:     DULoxetine (CYMBALTA) 30 MG capsule, Take 1 capsule by mouth Daily., Disp: , Rfl:     levothyroxine (SYNTHROID, LEVOTHROID) 200 MCG tablet, Take 225 mcg by mouth Daily., Disp: , Rfl:     metoprolol succinate XL (TOPROL-XL) 25 MG 24 hr tablet, Take 1 tablet by mouth Daily., Disp: 30 tablet, Rfl: 11    nitroglycerin (NITROSTAT) 0.4 MG SL tablet, 1 under the tongue as needed for angina, may repeat q5mins for up three doses, Disp: 100 tablet, Rfl: 11    omeprazole (priLOSEC) 20 MG capsule, Take 1 capsule by mouth Daily., Disp: , Rfl:    Assessment:        [unfilled]            Plan:            ICD-10-CM ICD-9-CM   1. Precordial pain  R07.2 786.51   2. Shortness of breath  R06.02 786.05   3. Syncope and collapse  R55 780.2   4. Dizziness  R42 780.4     1. Precordial pain  Atypical    2. " Shortness of breath  Continue current treatment    3. Syncope and collapse  No more syncope    4. Dizziness  Multifactorial      Gisela SU Michelle here for the follow-up post heart catheter, being treated medically.Gisela SU Michelle has no complications.  Access site has been examined shows no complications.  Patient has been advised to contact us with any further issues and questions related to the heart catheter    COUNSELING:    Gisela Ridley was given to patient for the following topics: diagnostic results, risk factor reductions, impressions, risks and benefits of treatment options and importance of treatment compliance .       SMOKING COUNSELING:        Dictated using Dragon dictation

## 2024-07-10 ENCOUNTER — PRIOR AUTHORIZATION (OUTPATIENT)
Dept: ONCOLOGY | Facility: CLINIC | Age: 59
End: 2024-07-10
Payer: COMMERCIAL

## 2024-07-10 ENCOUNTER — LAB (OUTPATIENT)
Dept: LAB | Facility: HOSPITAL | Age: 59
End: 2024-07-10
Payer: COMMERCIAL

## 2024-07-10 ENCOUNTER — CONSULT (OUTPATIENT)
Dept: ONCOLOGY | Facility: CLINIC | Age: 59
End: 2024-07-10
Payer: COMMERCIAL

## 2024-07-10 VITALS
DIASTOLIC BLOOD PRESSURE: 77 MMHG | SYSTOLIC BLOOD PRESSURE: 113 MMHG | BODY MASS INDEX: 33.04 KG/M2 | HEART RATE: 60 BPM | OXYGEN SATURATION: 98 % | WEIGHT: 205.6 LBS | TEMPERATURE: 97.7 F | HEIGHT: 66 IN | RESPIRATION RATE: 18 BRPM

## 2024-07-10 DIAGNOSIS — R05.2 SUBACUTE COUGH: ICD-10-CM

## 2024-07-10 DIAGNOSIS — D75.1 POLYCYTHEMIA: Primary | ICD-10-CM

## 2024-07-10 DIAGNOSIS — D72.820 LYMPHOCYTOSIS: Primary | ICD-10-CM

## 2024-07-10 DIAGNOSIS — R31.29 MICROSCOPIC HEMATURIA: ICD-10-CM

## 2024-07-10 DIAGNOSIS — D72.820 LYMPHOCYTOSIS: ICD-10-CM

## 2024-07-10 PROBLEM — R05.9 COUGH: Status: ACTIVE | Noted: 2024-07-10

## 2024-07-10 LAB
ALBUMIN SERPL-MCNC: 4.2 G/DL (ref 3.5–5.2)
ALBUMIN/GLOB SERPL: 1.4 G/DL
ALP SERPL-CCNC: 114 U/L (ref 39–117)
ALT SERPL W P-5'-P-CCNC: 21 U/L (ref 1–33)
ANION GAP SERPL CALCULATED.3IONS-SCNC: 10.7 MMOL/L (ref 5–15)
AST SERPL-CCNC: 17 U/L (ref 1–32)
BASOPHILS # BLD AUTO: 0.11 10*3/MM3 (ref 0–0.2)
BASOPHILS NFR BLD AUTO: 1 % (ref 0–1.5)
BILIRUB SERPL-MCNC: 0.3 MG/DL (ref 0–1.2)
BUN SERPL-MCNC: 16 MG/DL (ref 6–20)
BUN/CREAT SERPL: 20.5 (ref 7–25)
CALCIUM SPEC-SCNC: 9.7 MG/DL (ref 8.6–10.5)
CHLORIDE SERPL-SCNC: 105 MMOL/L (ref 98–107)
CO2 SERPL-SCNC: 25.3 MMOL/L (ref 22–29)
CREAT SERPL-MCNC: 0.78 MG/DL (ref 0.57–1)
DEPRECATED RDW RBC AUTO: 46.3 FL (ref 37–54)
EGFRCR SERPLBLD CKD-EPI 2021: 88.2 ML/MIN/1.73
EOSINOPHIL # BLD AUTO: 0.26 10*3/MM3 (ref 0–0.4)
EOSINOPHIL NFR BLD AUTO: 2.4 % (ref 0.3–6.2)
ERYTHROCYTE [DISTWIDTH] IN BLOOD BY AUTOMATED COUNT: 15.3 % (ref 12.3–15.4)
GLOBULIN UR ELPH-MCNC: 3 GM/DL
GLUCOSE SERPL-MCNC: 97 MG/DL (ref 65–99)
HCT VFR BLD AUTO: 42.1 % (ref 34–46.6)
HGB BLD-MCNC: 13.9 G/DL (ref 12–15.9)
IMM GRANULOCYTES # BLD AUTO: 0.06 10*3/MM3 (ref 0–0.05)
IMM GRANULOCYTES NFR BLD AUTO: 0.6 % (ref 0–0.5)
LDH SERPL-CCNC: 159 U/L (ref 135–214)
LYMPHOCYTES # BLD AUTO: 3.7 10*3/MM3 (ref 0.7–3.1)
LYMPHOCYTES NFR BLD AUTO: 34.2 % (ref 19.6–45.3)
MCH RBC QN AUTO: 27.7 PG (ref 26.6–33)
MCHC RBC AUTO-ENTMCNC: 33 G/DL (ref 31.5–35.7)
MCV RBC AUTO: 84 FL (ref 79–97)
MONOCYTES # BLD AUTO: 0.89 10*3/MM3 (ref 0.1–0.9)
MONOCYTES NFR BLD AUTO: 8.2 % (ref 5–12)
NEUTROPHILS NFR BLD AUTO: 5.81 10*3/MM3 (ref 1.7–7)
NEUTROPHILS NFR BLD AUTO: 53.6 % (ref 42.7–76)
NRBC BLD AUTO-RTO: 0 /100 WBC (ref 0–0.2)
PLATELET # BLD AUTO: 324 10*3/MM3 (ref 140–450)
PMV BLD AUTO: 10.4 FL (ref 6–12)
POTASSIUM SERPL-SCNC: 4.1 MMOL/L (ref 3.5–5.2)
PROT SERPL-MCNC: 7.2 G/DL (ref 6–8.5)
RBC # BLD AUTO: 5.01 10*6/MM3 (ref 3.77–5.28)
SODIUM SERPL-SCNC: 141 MMOL/L (ref 136–145)
WBC NRBC COR # BLD AUTO: 10.83 10*3/MM3 (ref 3.4–10.8)

## 2024-07-10 PROCEDURE — 99204 OFFICE O/P NEW MOD 45 MIN: CPT | Performed by: INTERNAL MEDICINE

## 2024-07-10 PROCEDURE — 80053 COMPREHEN METABOLIC PANEL: CPT | Performed by: INTERNAL MEDICINE

## 2024-07-10 PROCEDURE — 36415 COLL VENOUS BLD VENIPUNCTURE: CPT | Performed by: INTERNAL MEDICINE

## 2024-07-10 PROCEDURE — 83615 LACTATE (LD) (LDH) ENZYME: CPT | Performed by: INTERNAL MEDICINE

## 2024-07-10 PROCEDURE — 85025 COMPLETE CBC W/AUTO DIFF WBC: CPT | Performed by: INTERNAL MEDICINE

## 2024-07-10 RX ORDER — HYDROXYZINE HYDROCHLORIDE 25 MG/1
25 TABLET, FILM COATED ORAL
COMMUNITY
Start: 2024-07-05

## 2024-07-10 RX ORDER — ESCITALOPRAM OXALATE 20 MG/1
1 TABLET ORAL DAILY
COMMUNITY
Start: 2024-06-14

## 2024-07-10 RX ORDER — LEVOTHYROXINE SODIUM 0.2 MG/1
200 TABLET ORAL DAILY
COMMUNITY
Start: 2024-07-01 | End: 2024-12-28

## 2024-07-10 NOTE — TELEPHONE ENCOUNTER
No PA required for Flow [id number] per [facility]. Tracking # [id number]. Ok'd lab to send to [facility].

## 2024-07-10 NOTE — PROGRESS NOTES
Subjective     REASON FOR CONSULTATION:  lymphocytosis  Provide an opinion on any further workup or treatment                             REQUESTING PHYSICIAN:  Stephon    RECORDS OBTAINED:  Records of the patients history including those obtained from the referring provider were reviewed and summarized in detail.    HISTORY OF PRESENT ILLNESS:  The patient is a 59 y.o. year old female who is here for an opinion about the above issue.    History of Present Illness   This is a very pleasant 59-year-old woman who is referred from her primary care provider for evaluation of lymphocytosis.  Reviewing previous lab 10/4/2023 WBC 12.5, hemoglobin 14.3, platelets 373 and white cell differential normal except for increased lymphocytes 4.76.    The patient quit smoking tobacco approximately 6 months ago.  She complains of cough and tightness in the chest.  She also has had weight gain since smoking cessation.  She complains of fatigue.  She has pelvic pain anteriorly lower aspect of the vagina and is scheduled to see gynecology.  She denies vaginal bleeding or discharge.  She denies changes in the bowel habits or pain with defecation.  She has history of microscopic hematuria and is scheduled to see urology.  She denies lymphadenopathy or night sweats.    Past Medical History:   Diagnosis Date    Adrenal adenoma     bilateral, having adrenal studies done     Anxiety     Arthritis     GERD (gastroesophageal reflux disease)     Goiter     27 years ago    Hyperlipidemia     Hypothyroidism (acquired)     Radiation at age 27 years    PUD (peptic ulcer disease)     Seasonal allergies     Spinal stenosis     Syncope         Past Surgical History:   Procedure Laterality Date    ABDOMINAL SURGERY      Benign tumor on your left side    APPENDECTOMY      CARDIAC CATHETERIZATION N/A 10/6/2023    Procedure: Left Heart Cath;  Surgeon: Nancy Sylvester MD;  Location: [facility] INVASIVE LOCATION;  Service: Cardiovascular;   Laterality: N/A;    CARDIAC CATHETERIZATION N/A 10/6/2023    Procedure: Coronary angiography;  Surgeon: Nancy Sylvester MD;  Location:  [facility] INVASIVE LOCATION;  Service: Cardiovascular;  Laterality: N/A;    CARDIAC CATHETERIZATION N/A 10/6/2023    Procedure: Left ventriculography;  Surgeon: Nancy Sylvester MD;  Location:  [facility] INVASIVE LOCATION;  Service: Cardiovascular;  Laterality: N/A;    CARPAL TUNNEL RELEASE Right 9/11/2018    Procedure: CARPAL TUNNEL RELEASE;  Surgeon: Chico Maddox MD;  Location:  [facility] OR;  Service: Orthopedics    COLONOSCOPY N/A 6/19/2018    Procedure: COLONOSCOPY and polypectomy;  Surgeon: Anika Stephens DO;  Location:  [facility] OR;  Service: Gastroenterology    COLONOSCOPY W/ POLYPECTOMY      ENDOSCOPY N/A 6/19/2018    Procedure: ESOPHAGOGASTRODUODENOSCOPY with ELLA test ;  Surgeon: Anika Stephens DO;  Location:  [facility] OR;  Service: Gastroenterology    HYSTERECTOMY      LAPAROSCOPIC CHOLECYSTECTOMY      OTHER SURGICAL HISTORY      nodule removed from larynx    OTHER SURGICAL HISTORY      radiated thyroid    TUBAL ABDOMINAL LIGATION          Current Outpatient Medications on File Prior to Visit   Medication Sig Dispense Refill    atorvastatin (LIPITOR) 80 MG tablet Take 1 tablet by mouth Every Night.      cholecalciferol 25 MCG tablet Take 1 tablet by mouth Daily.      escitalopram (LEXAPRO) 20 MG tablet Take 1 tablet by mouth Daily.      hydrOXYzine (ATARAX) 25 MG tablet Take 1 tablet by mouth.      levothyroxine (Synthroid) 200 MCG tablet Take 1 tablet by mouth Daily. Patient is taking the brand Synthroid and not the generic      [DISCONTINUED] DULoxetine (CYMBALTA) 30 MG capsule Take 1 capsule by mouth Daily.      [DISCONTINUED] levothyroxine (SYNTHROID, LEVOTHROID) 200 MCG tablet Take 225 mcg by mouth Daily.      [DISCONTINUED] metoprolol succinate XL (TOPROL-XL) 25 MG 24 hr tablet Take 1 tablet by mouth Daily. 30 tablet 11    [DISCONTINUED]  nitroglycerin (NITROSTAT) 0.4 MG SL tablet 1 under the tongue as needed for angina, may repeat q5mins for up three doses 100 tablet 11    [DISCONTINUED] omeprazole (priLOSEC) 20 MG capsule Take 1 capsule by mouth Daily.       No current facility-administered medications on file prior to visit.        ALLERGIES:    Allergies   Allergen Reactions    Venomil Honey Bee Venom [Honey Bee Venom] Anaphylaxis    Penicillins Unknown (See Comments)     Anaphylaxis          Social History     Socioeconomic History    Marital status:    Tobacco Use    Smoking status: Former     Current packs/day: 0.00     Average packs/day: 1 pack/day for 20.0 years (20.0 ttl pk-yrs)     Types: Cigarettes     Start date:      Quit date:      Years since quittin.5    Smokeless tobacco: Never    Tobacco comments:     quit 5 months ago   Vaping Use    Vaping status: Never Used   Substance and Sexual Activity    Alcohol use: Yes     Comment: social, once/year    Drug use: No    Sexual activity: Defer     Partners: Male     Birth control/protection: Hysterectomy        Family History   Problem Relation Age of Onset    Cancer Mother     Colon cancer Father     Heart disease Father     Cancer Sister     Colon polyps Sister     Malig Hyperthermia Neg Hx         Review of Systems   Constitutional:  Positive for fatigue and unexpected weight change (Weight gain). Negative for fever.   HENT: Negative.     Respiratory:  Positive for cough, chest tightness and shortness of breath. Negative for wheezing.    Cardiovascular: Negative.    Gastrointestinal:  Negative for constipation and diarrhea.   Genitourinary:  Positive for hematuria.   Musculoskeletal:  Positive for arthralgias.   Skin: Negative.    Hematological: Negative.    Psychiatric/Behavioral: Negative.            Objective     Vitals:    07/10/24 1118   BP: 113/77   Pulse: 60   Resp: 18   Temp: 97.7 °F (36.5 °C)   TempSrc: Temporal   SpO2: 98%   Weight: 93.3 kg (205 lb 9.6 oz)  "  Height: 167.6 cm (65.98\")   PainSc: 0-No pain         7/10/2024    11:13 AM   Current Status   ECOG score 0       Physical Exam    CONSTITUTIONAL: pleasant well-developed adult woman  HEENT: no icterus, no thrush, moist membranes  LYMPH: no cervical or supraclavicular lad  CV: RRR, S1S2, no murmur  RESP: cta bilat, coarse bs  GI: soft, non-tender, no splenomegaly, +bs  MUSC: no edema, normal gait  NEURO: alert and oriented x3, normal strength  PSYCH: normal mood and affect      RECENT LABS:  Hematology WBC   Date Value Ref Range Status   07/10/2024 10.83 (H) 3.40 - 10.80 10*3/mm3 Final     RBC   Date Value Ref Range Status   07/10/2024 5.01 3.77 - 5.28 10*6/mm3 Final     Hemoglobin   Date Value Ref Range Status   07/10/2024 13.9 12.0 - 15.9 g/dL Final     Hematocrit   Date Value Ref Range Status   07/10/2024 42.1 34.0 - 46.6 % Final     Platelets   Date Value Ref Range Status   07/10/2024 324 140 - 450 10*3/mm3 Final        Lab Results   Component Value Date    GLUCOSE 97 07/10/2024    BUN 16 07/10/2024    CREATININE 0.78 07/10/2024    EGFR 88.2 07/10/2024    BCR 20.5 07/10/2024    K 4.1 07/10/2024    CO2 25.3 07/10/2024    CALCIUM 9.7 07/10/2024    ALBUMIN 4.2 07/10/2024    BILITOT 0.3 07/10/2024    AST 17 07/10/2024    ALT 21 07/10/2024       Assessment & Plan     *Persistent lymphocytosis  The patient has had mild increased lymphocyte count since October 2023 3.7-4.76 probable reactive but need to rule out a lymphoproliferative population  *Cough/shortness of air/chest tightness, history of lung nodule  *Pelvic pain  *Microscopic hematuria  *History of tobacco abuse, abstinent x 6 months    Oncology/hematology plan/recommendations:  Peripheral blood flow cytometry and LDH to assess lymphocytosis  CT chest abdomen pelvis with IV and p.o. contrast to assess lymphocytosis/shortness of breath/pelvic pain/microscopic hematuria    Thank you for allowing me to participate in the care of this pleasant patient.  Will " plan to call the patient with results and further advice pending the results.

## 2024-07-12 LAB — REF LAB TEST METHOD: NORMAL

## 2024-07-19 ENCOUNTER — HOSPITAL ENCOUNTER (OUTPATIENT)
Dept: CT IMAGING | Facility: HOSPITAL | Age: 59
Discharge: HOME | End: 2024-07-19
Admitting: INTERNAL MEDICINE
Payer: COMMERCIAL

## 2024-07-19 DIAGNOSIS — R05.2 SUBACUTE COUGH: ICD-10-CM

## 2024-07-19 DIAGNOSIS — D72.820 LYMPHOCYTOSIS: ICD-10-CM

## 2024-07-19 DIAGNOSIS — R31.29 MICROSCOPIC HEMATURIA: ICD-10-CM

## 2024-07-19 PROCEDURE — 74177 CT ABD & PELVIS W/CONTRAST: CPT

## 2024-07-19 PROCEDURE — 71260 CT THORAX DX C+: CPT

## 2024-07-19 PROCEDURE — 25510000001 IOPAMIDOL PER 1 ML: Performed by: INTERNAL MEDICINE

## 2024-07-19 RX ADMIN — IOPAMIDOL 100 ML: 755 INJECTION, SOLUTION INTRAVENOUS at 17:43

## 2024-07-22 ENCOUNTER — TELEPHONE (OUTPATIENT)
Dept: ONCOLOGY | Facility: CLINIC | Age: 59
End: 2024-07-22
Payer: COMMERCIAL

## 2024-07-22 ENCOUNTER — PATIENT MESSAGE (OUTPATIENT)
Dept: ONCOLOGY | Facility: CLINIC | Age: 59
End: 2024-07-22
Payer: COMMERCIAL

## 2024-07-22 DIAGNOSIS — E27.8 MASS OF BOTH ADRENAL GLANDS: ICD-10-CM

## 2024-07-22 DIAGNOSIS — N28.89 MASS OF LEFT KIDNEY: Primary | ICD-10-CM

## 2024-07-22 NOTE — TELEPHONE ENCOUNTER
Regarding: Results  ----- Message from Celestino Wong RN sent at 7/22/2024  3:19 PM EDT -----  Please schedule MRI abdomen. Thanks!     ----- Message from Gisela Martinez to Celestino Wong RN sent at 7/22/2024  3:15 PM -----   Yes please do and thank you      ----- Message -----       From:Celestino SANTOS       Sent:7/22/2024  3:13 PM EDT         To:Gisela Martinez    Subject:Results    Hi Gisela,    I am Dr. Donell Tirado's clinic nurse. I am writing this message to you since it's kind of a lengthy one and I want you to be able to catch it all.     Dr. Marley would like you to know that your blood workup showed no evidence of chronic leukemia/was normal.     Your CTs show no lung nodules or lymph nodes but the CT of your abdomen shows bilateral adrenal masses that are suggestive of benign adenomas and radiology recommended an MRI of your abdomen with IV contrast to better assess.     There is also a mass on the left kidney that needs to be evaluated by urology as soon as possible.    So if this is all okay with you, I will have scheduling contact you to set up the MRI of your abdomen and I will have our referral coordinator work on getting you in with [facility] for a consultation.    ANA Tirado

## 2024-07-24 ENCOUNTER — TELEPHONE (OUTPATIENT)
Dept: ONCOLOGY | Facility: CLINIC | Age: 59
End: 2024-07-24
Payer: COMMERCIAL

## 2024-07-24 DIAGNOSIS — E27.8 MASS OF BOTH ADRENAL GLANDS: ICD-10-CM

## 2024-07-24 DIAGNOSIS — N28.89 MASS OF LEFT KIDNEY: Primary | ICD-10-CM

## 2024-07-24 NOTE — TELEPHONE ENCOUNTER
Caller: Gisela Martinez    Relationship: Self    Best call back number: [phone]     What is the best time to reach you: ANYTIME    Who are you requesting to speak with (clinical staff, provider,  specific staff member): CLINICAL    What was the call regarding: PT CALLED [facility] ABOUT AN APPT AND THE FIRST AVAILABLE THAT THEY HAVE AT ANY OF THEIR OFFICES IS 09/17/24.  PT WAS ADVISED THAT DR MILES WANTED HER SEEN THIS WEEK AND PT IS NOT SURE WHAT TO DO.    PLEASE ADVISE.     Is it okay if the provider responds through MyChart: YES

## 2024-07-25 NOTE — TELEPHONE ENCOUNTER
Called the patient after discussing with Constance in referrals and Dr. Marley. Patient does not want to wait until 9/17 and would like to be referred to . New referral was placed and patient v/u.

## 2024-07-29 ENCOUNTER — HOSPITAL ENCOUNTER (OUTPATIENT)
Dept: MRI IMAGING | Facility: HOSPITAL | Age: 59
Discharge: HOME | End: 2024-07-29
Admitting: INTERNAL MEDICINE
Payer: COMMERCIAL

## 2024-07-29 DIAGNOSIS — E27.8 MASS OF BOTH ADRENAL GLANDS: ICD-10-CM

## 2024-07-29 PROCEDURE — 0 GADOBENATE DIMEGLUMINE 529 MG/ML SOLUTION: Performed by: INTERNAL MEDICINE

## 2024-07-29 PROCEDURE — 74183 MRI ABD W/O CNTR FLWD CNTR: CPT

## 2024-07-29 PROCEDURE — A9577 INJ MULTIHANCE: HCPCS | Performed by: INTERNAL MEDICINE

## 2024-07-29 RX ADMIN — GADOBENATE DIMEGLUMINE 18 ML: 529 INJECTION, SOLUTION INTRAVENOUS at 08:10

## 2024-08-15 ENCOUNTER — TELEPHONE (OUTPATIENT)
Dept: ONCOLOGY | Facility: CLINIC | Age: 59
End: 2024-08-15

## 2024-08-15 NOTE — TELEPHONE ENCOUNTER
Caller: Gisela Martinez    Relationship: Self    Best call back number: [phone]     What is the best time to reach you: ANYTIME    Who are you requesting to speak with (clinical staff, provider,  specific staff member): NON-CLINICAL    What was the call regarding: NEEDS THE CT AND MRI RESULTS SENT OVER TO [facility] DR BARRON - [place] LOC[facility]    PH: [phone]    THE PT HAS HER APPT ON MONDAY SO THEY NEED TO BE SENT OVER FOR THAT. DR BARRON IS WANTING THEM SENT OVER.     Is it okay if the provider responds through [facility]: YES

## 2024-09-04 ENCOUNTER — PRE-ADMISSION TESTING (OUTPATIENT)
Dept: PREADMISSION TESTING | Facility: HOSPITAL | Age: 59
End: 2024-09-04
Payer: COMMERCIAL

## 2024-09-04 VITALS
HEIGHT: 66 IN | DIASTOLIC BLOOD PRESSURE: 82 MMHG | SYSTOLIC BLOOD PRESSURE: 118 MMHG | HEART RATE: 84 BPM | WEIGHT: 201 LBS | RESPIRATION RATE: 16 BRPM | BODY MASS INDEX: 32.3 KG/M2

## 2024-09-04 LAB
ANION GAP SERPL CALCULATED.3IONS-SCNC: 11.2 MMOL/L (ref 5–15)
BUN SERPL-MCNC: 17 MG/DL (ref 6–20)
BUN/CREAT SERPL: 19.5 (ref 7–25)
CALCIUM SPEC-SCNC: 9.5 MG/DL (ref 8.6–10.5)
CHLORIDE SERPL-SCNC: 105 MMOL/L (ref 98–107)
CO2 SERPL-SCNC: 25.8 MMOL/L (ref 22–29)
CREAT SERPL-MCNC: 0.87 MG/DL (ref 0.57–1)
DEPRECATED RDW RBC AUTO: 48.6 FL (ref 37–54)
EGFRCR SERPLBLD CKD-EPI 2021: 77.3 ML/MIN/1.73
ERYTHROCYTE [DISTWIDTH] IN BLOOD BY AUTOMATED COUNT: 15.4 % (ref 12.3–15.4)
GLUCOSE SERPL-MCNC: 92 MG/DL (ref 65–99)
HCT VFR BLD AUTO: 45.3 % (ref 34–46.6)
HGB BLD-MCNC: 14.2 G/DL (ref 12–15.9)
MCH RBC QN AUTO: 27 PG (ref 26.6–33)
MCHC RBC AUTO-ENTMCNC: 31.3 G/DL (ref 31.5–35.7)
MCV RBC AUTO: 86.3 FL (ref 79–97)
PLATELET # BLD AUTO: 381 10*3/MM3 (ref 140–450)
PMV BLD AUTO: 10.4 FL (ref 6–12)
POTASSIUM SERPL-SCNC: 4.6 MMOL/L (ref 3.5–5.2)
RBC # BLD AUTO: 5.25 10*6/MM3 (ref 3.77–5.28)
SODIUM SERPL-SCNC: 142 MMOL/L (ref 136–145)
WBC NRBC COR # BLD AUTO: 11.43 10*3/MM3 (ref 3.4–10.8)

## 2024-09-04 PROCEDURE — 36415 COLL VENOUS BLD VENIPUNCTURE: CPT

## 2024-09-04 PROCEDURE — 80048 BASIC METABOLIC PNL TOTAL CA: CPT | Performed by: UROLOGY

## 2024-09-04 PROCEDURE — 85027 COMPLETE CBC AUTOMATED: CPT | Performed by: UROLOGY

## 2024-09-04 RX ORDER — CITALOPRAM HYDROBROMIDE 20 MG/1
20 TABLET ORAL DAILY
COMMUNITY

## 2024-09-04 NOTE — DISCHARGE INSTRUCTIONS
PRE-ADMISSION TESTING INSTRUCTIONS FOR ADULTS    Take these medications the morning of surgery with a small sip of water:  citalopram, levothyroxine       Do not take any insulin or diabetes medications the morning of surgery.      No aspirin, advil, aleve, ibuprofen, naproxen, diet pills, decongestants, or herbal/vitamins for a week prior to surgery.       Tylenol/Acetaminophen is okay to take if needed.    General Instructions:    DO NOT EAT SOLID FOOD AFTER MIDNIGHT THE NIGHT BEFORE SURGERY. No gum, mints, or hard candy after midnight the night before surgery.  You may drink clear liquids the day of surgery up until 2 hours before your arrival time.  Clear liquids are liquids you can see through. Nothing RED in color.    Plain water    Sports drinks      Gelatin (Jell-O)  Fruit juices without pulp such as white grape juice and apple juice  Popsicles that contain no fruit or yogurt  Tea or coffee (no cream or milk added)    It is beneficial for you to have a clear drink that contains carbohydrates 2 hours before your arrival time.  We suggest a 20 ounce bottle of Gatorade or Powerade for non-diabetic patients or a 20 ounce bottle of Gatorade Zero or Powerade Zero for diabetic patients.     Patients who avoid smoking, chewing tobacco and alcohol for 4 weeks prior to surgery have a reduced risk of post-operative complications.  If at all possible, quit smoking as many days before surgery as you can.    Do not smoke, use chewing tobacco or drink alcohol the day of surgery    Bring your C-PAP/ BI-PAP machine if you use one.  Wear clean comfortable clothes.  Do not wear contact lenses, lotion, deodorant, or make-up.  Bring a case for your glasses if applicable. You may brush your teeth the morning of surgery.  You may wear dentures/partials, do not put adhesive/glue on them.  Leave all other jewelry and valuables at home.      Preventing a Surgical Site Infection:    Shower the night before and on the morning of  surgery using the chlorhexidine soap you were given.  Use a clean washcloth with the soap.  Place clean sheets on your bed after showering the night before surgery. Do not use the CHG soap on your hair, face, or private areas. Wash your body gently for five (5) minutes. Do not scrub your skin.  Dry with a clean towel and dress in clean clothing.  Do not shave the surgical area for 10 days-2 weeks prior to surgery  because the razor can irritate skin and make it easier to develop an infection.  Make sure you, your family, and all healthcare providers clean their hands with soap and water or an alcohol based hand  before caring for you or your wound.      Day of surgery:    Your surgeon’s office will advise you of your arrival time for the day of surgery.    Upon arrival, a Pre-op nurse and Anesthesia provider will review your health history, obtain vital signs, and answer questions you may have. The anesthesia provider will also discuss the type of anesthesia that will be needed for your procedure, which may include general anesthesia. The only belongings needed at this time will be your home medications and if applicable your C-PAP/BI-PAP machine.  If you are staying overnight your family can leave the rest of your belongings in the car and bring them to your room later.  A Pre-op nurse will start an IV and you may receive medication in preparation for surgery, including something to help you relax.  Your family will be able to see you in the Pre-op area.  While you are in surgery your family should notify the waiting room  if they leave the waiting room area and provide a contact phone number.    IF you have any questions, you can call the Pre-Admission Department at [phone] or your surgeon's office.  Notify your surgeon if  you become sick, have a fever, productive cough, or cannot be here the day of surgery    Please be aware that surgery does come with discomfort.  We want to make  every effort to control your discomfort so please discuss any uncontrolled symptoms with your nurse.   Your doctor will most likely have prescribed pain medications.      If you are going home after surgery, you will receive individualized written care instructions before being discharged.  A responsible adult (over the age of 18) must drive you to and from the hospital on the day of your surgery and stay with you for 24 hours after anesthesia.    If you are staying overnight following surgery, you will be transported to your hospital room following the recovery period.  [facility] has all private rooms.    You may receive a survey regarding the care you received. Your feedback is very important and will be used to collect the necessary data to help us to continue to provide excellent care.     Deductibles and co-payments are collected on the day of service. Please be prepared to pay the required co-pay, deductible or deposit on the day of service as defined by your plan.

## 2024-09-10 ENCOUNTER — ANESTHESIA EVENT (OUTPATIENT)
Dept: PERIOP | Facility: HOSPITAL | Age: 59
End: 2024-09-10
Payer: COMMERCIAL

## 2024-09-10 ENCOUNTER — HOSPITAL ENCOUNTER (OUTPATIENT)
Dept: ULTRASOUND IMAGING | Facility: HOSPITAL | Age: 59
Discharge: HOME | End: 2024-09-10

## 2024-09-11 ENCOUNTER — HOSPITAL ENCOUNTER (OUTPATIENT)
Facility: HOSPITAL | Age: 59
Setting detail: OBSERVATION
Discharge: HOME | End: 2024-09-12
Attending: UROLOGY | Admitting: UROLOGY
Payer: COMMERCIAL

## 2024-09-11 ENCOUNTER — ANESTHESIA (OUTPATIENT)
Dept: PERIOP | Facility: HOSPITAL | Age: 59
End: 2024-09-11
Payer: COMMERCIAL

## 2024-09-11 DIAGNOSIS — N28.89 RENAL MASS: Primary | ICD-10-CM

## 2024-09-11 DIAGNOSIS — N28.89 KIDNEY MASS: ICD-10-CM

## 2024-09-11 PROCEDURE — 25010000002 EPINEPHRINE (ANAPHYLAXIS) 1 MG/ML SOLUTION: Performed by: NURSE ANESTHETIST, CERTIFIED REGISTERED

## 2024-09-11 PROCEDURE — 25010000002 SUCCINYLCHOLINE PER 20 MG: Performed by: NURSE ANESTHETIST, CERTIFIED REGISTERED

## 2024-09-11 PROCEDURE — 25010000002 PROPOFOL 10 MG/ML EMULSION: Performed by: NURSE ANESTHETIST, CERTIFIED REGISTERED

## 2024-09-11 PROCEDURE — 88307 TISSUE EXAM BY PATHOLOGIST: CPT | Performed by: UROLOGY

## 2024-09-11 PROCEDURE — 25810000003 SODIUM CHLORIDE 0.9 % SOLUTION: Performed by: UROLOGY

## 2024-09-11 PROCEDURE — 25010000002 SUGAMMADEX 200 MG/2ML SOLUTION: Performed by: NURSE ANESTHETIST, CERTIFIED REGISTERED

## 2024-09-11 PROCEDURE — 25010000002 BUPIVACAINE (PF) 0.25 % SOLUTION: Performed by: NURSE ANESTHETIST, CERTIFIED REGISTERED

## 2024-09-11 PROCEDURE — 25810000003 LACTATED RINGERS PER 1000 ML

## 2024-09-11 PROCEDURE — 25010000002 MIDAZOLAM PER 1MG

## 2024-09-11 PROCEDURE — 94761 N-INVAS EAR/PLS OXIMETRY MLT: CPT

## 2024-09-11 PROCEDURE — 25010000002 FENTANYL CITRATE (PF) 50 MCG/ML SOLUTION: Performed by: NURSE ANESTHETIST, CERTIFIED REGISTERED

## 2024-09-11 PROCEDURE — 25010000002 CEFAZOLIN PER 500 MG: Performed by: UROLOGY

## 2024-09-11 PROCEDURE — 25010000002 PHENYLEPHRINE 10 MG/ML SOLUTION: Performed by: NURSE ANESTHETIST, CERTIFIED REGISTERED

## 2024-09-11 PROCEDURE — G0378 HOSPITAL OBSERVATION PER HR: HCPCS

## 2024-09-11 PROCEDURE — 25010000002 DEXAMETHASONE PER 1 MG

## 2024-09-11 PROCEDURE — 25010000002 ONDANSETRON PER 1 MG

## 2024-09-11 PROCEDURE — 25010000002 HYDROMORPHONE 1 MG/ML SOLUTION: Performed by: NURSE ANESTHETIST, CERTIFIED REGISTERED

## 2024-09-11 PROCEDURE — 25010000002 BUPIVACAINE (PF) 0.25 % SOLUTION: Performed by: UROLOGY

## 2024-09-11 DEVICE — FLOSEAL WITH RECOTHROM - 5ML
Type: IMPLANTABLE DEVICE | Site: KIDNEY | Status: FUNCTIONAL
Brand: FLOSEAL HEMOSTATIC MATRIX

## 2024-09-11 RX ORDER — SODIUM CHLORIDE, SODIUM LACTATE, POTASSIUM CHLORIDE, CALCIUM CHLORIDE 600; 310; 30; 20 MG/100ML; MG/100ML; MG/100ML; MG/100ML
30 INJECTION, SOLUTION INTRAVENOUS CONTINUOUS
Status: DISCONTINUED | OUTPATIENT
Start: 2024-09-11 | End: 2024-09-11

## 2024-09-11 RX ORDER — BUPIVACAINE HYDROCHLORIDE 2.5 MG/ML
INJECTION, SOLUTION EPIDURAL; INFILTRATION; INTRACAUDAL
Status: COMPLETED | OUTPATIENT
Start: 2024-09-11 | End: 2024-09-11

## 2024-09-11 RX ORDER — SODIUM CHLORIDE 9 MG/ML
40 INJECTION, SOLUTION INTRAVENOUS AS NEEDED
Status: DISCONTINUED | OUTPATIENT
Start: 2024-09-11 | End: 2024-09-12 | Stop reason: HOSPADM

## 2024-09-11 RX ORDER — DEXAMETHASONE SODIUM PHOSPHATE 4 MG/ML
4 INJECTION, SOLUTION INTRA-ARTICULAR; INTRALESIONAL; INTRAMUSCULAR; INTRAVENOUS; SOFT TISSUE ONCE AS NEEDED
Status: COMPLETED | OUTPATIENT
Start: 2024-09-11 | End: 2024-09-11

## 2024-09-11 RX ORDER — ONDANSETRON 2 MG/ML
4 INJECTION INTRAMUSCULAR; INTRAVENOUS ONCE AS NEEDED
Status: COMPLETED | OUTPATIENT
Start: 2024-09-11 | End: 2024-09-11

## 2024-09-11 RX ORDER — ONDANSETRON 4 MG/1
4 TABLET, ORALLY DISINTEGRATING ORAL EVERY 6 HOURS PRN
Status: DISCONTINUED | OUTPATIENT
Start: 2024-09-11 | End: 2024-09-12 | Stop reason: HOSPADM

## 2024-09-11 RX ORDER — AMOXICILLIN 250 MG
2 CAPSULE ORAL 2 TIMES DAILY
Status: DISCONTINUED | OUTPATIENT
Start: 2024-09-11 | End: 2024-09-12 | Stop reason: HOSPADM

## 2024-09-11 RX ORDER — CHOLECALCIFEROL (VITAMIN D3) 25 MCG
1000 TABLET ORAL DAILY
Status: DISCONTINUED | OUTPATIENT
Start: 2024-09-11 | End: 2024-09-12 | Stop reason: HOSPADM

## 2024-09-11 RX ORDER — SODIUM CHLORIDE 0.9 % (FLUSH) 0.9 %
10 SYRINGE (ML) INJECTION EVERY 12 HOURS SCHEDULED
Status: DISCONTINUED | OUTPATIENT
Start: 2024-09-11 | End: 2024-09-11 | Stop reason: HOSPADM

## 2024-09-11 RX ORDER — FENTANYL CITRATE 50 UG/ML
INJECTION, SOLUTION INTRAMUSCULAR; INTRAVENOUS AS NEEDED
Status: DISCONTINUED | OUTPATIENT
Start: 2024-09-11 | End: 2024-09-11 | Stop reason: SURG

## 2024-09-11 RX ORDER — OXYCODONE AND ACETAMINOPHEN 7.5; 325 MG/1; MG/1
1 TABLET ORAL EVERY 4 HOURS PRN
Status: DISCONTINUED | OUTPATIENT
Start: 2024-09-11 | End: 2024-09-12 | Stop reason: HOSPADM

## 2024-09-11 RX ORDER — SODIUM CHLORIDE 0.9 % (FLUSH) 0.9 %
10 SYRINGE (ML) INJECTION AS NEEDED
Status: DISCONTINUED | OUTPATIENT
Start: 2024-09-11 | End: 2024-09-11 | Stop reason: HOSPADM

## 2024-09-11 RX ORDER — SODIUM CHLORIDE 9 MG/ML
100 INJECTION, SOLUTION INTRAVENOUS CONTINUOUS
Status: DISCONTINUED | OUTPATIENT
Start: 2024-09-11 | End: 2024-09-12 | Stop reason: HOSPADM

## 2024-09-11 RX ORDER — CITALOPRAM HYDROBROMIDE 20 MG/1
20 TABLET ORAL DAILY
Status: DISCONTINUED | OUTPATIENT
Start: 2024-09-11 | End: 2024-09-12 | Stop reason: HOSPADM

## 2024-09-11 RX ORDER — EPINEPHRINE 1 MG/ML
INJECTION, SOLUTION INTRAMUSCULAR; SUBCUTANEOUS AS NEEDED
Status: DISCONTINUED | OUTPATIENT
Start: 2024-09-11 | End: 2024-09-11 | Stop reason: SURG

## 2024-09-11 RX ORDER — BUPIVACAINE HYDROCHLORIDE 2.5 MG/ML
INJECTION, SOLUTION EPIDURAL; INFILTRATION; INTRACAUDAL AS NEEDED
Status: DISCONTINUED | OUTPATIENT
Start: 2024-09-11 | End: 2024-09-11 | Stop reason: HOSPADM

## 2024-09-11 RX ORDER — PHENYLEPHRINE HYDROCHLORIDE 10 MG/ML
INJECTION INTRAVENOUS AS NEEDED
Status: DISCONTINUED | OUTPATIENT
Start: 2024-09-11 | End: 2024-09-11 | Stop reason: SURG

## 2024-09-11 RX ORDER — SUCCINYLCHOLINE CHLORIDE 20 MG/ML
INJECTION INTRAMUSCULAR; INTRAVENOUS AS NEEDED
Status: DISCONTINUED | OUTPATIENT
Start: 2024-09-11 | End: 2024-09-11 | Stop reason: SURG

## 2024-09-11 RX ORDER — ONDANSETRON 2 MG/ML
4 INJECTION INTRAMUSCULAR; INTRAVENOUS ONCE AS NEEDED
Status: DISCONTINUED | OUTPATIENT
Start: 2024-09-11 | End: 2024-09-11 | Stop reason: HOSPADM

## 2024-09-11 RX ORDER — FAMOTIDINE 20 MG/1
20 TABLET, FILM COATED ORAL
Status: COMPLETED | OUTPATIENT
Start: 2024-09-11 | End: 2024-09-11

## 2024-09-11 RX ORDER — ONDANSETRON 2 MG/ML
4 INJECTION INTRAMUSCULAR; INTRAVENOUS EVERY 6 HOURS PRN
Status: DISCONTINUED | OUTPATIENT
Start: 2024-09-11 | End: 2024-09-12 | Stop reason: HOSPADM

## 2024-09-11 RX ORDER — SODIUM CHLORIDE 9 MG/ML
40 INJECTION, SOLUTION INTRAVENOUS AS NEEDED
Status: DISCONTINUED | OUTPATIENT
Start: 2024-09-11 | End: 2024-09-11 | Stop reason: HOSPADM

## 2024-09-11 RX ORDER — ACETAMINOPHEN 500 MG
1000 TABLET ORAL ONCE
Status: COMPLETED | OUTPATIENT
Start: 2024-09-11 | End: 2024-09-11

## 2024-09-11 RX ORDER — LIDOCAINE HYDROCHLORIDE 20 MG/ML
INJECTION, SOLUTION INFILTRATION; PERINEURAL AS NEEDED
Status: DISCONTINUED | OUTPATIENT
Start: 2024-09-11 | End: 2024-09-11 | Stop reason: SURG

## 2024-09-11 RX ORDER — ROCURONIUM BROMIDE 10 MG/ML
INJECTION, SOLUTION INTRAVENOUS AS NEEDED
Status: DISCONTINUED | OUTPATIENT
Start: 2024-09-11 | End: 2024-09-11 | Stop reason: SURG

## 2024-09-11 RX ORDER — SODIUM CHLORIDE 0.9 % (FLUSH) 0.9 %
10 SYRINGE (ML) INJECTION AS NEEDED
Status: DISCONTINUED | OUTPATIENT
Start: 2024-09-11 | End: 2024-09-12 | Stop reason: HOSPADM

## 2024-09-11 RX ORDER — PROPOFOL 10 MG/ML
VIAL (ML) INTRAVENOUS AS NEEDED
Status: DISCONTINUED | OUTPATIENT
Start: 2024-09-11 | End: 2024-09-11 | Stop reason: SURG

## 2024-09-11 RX ORDER — MIDAZOLAM HYDROCHLORIDE 2 MG/2ML
1 INJECTION, SOLUTION INTRAMUSCULAR; INTRAVENOUS
Status: DISCONTINUED | OUTPATIENT
Start: 2024-09-11 | End: 2024-09-11 | Stop reason: HOSPADM

## 2024-09-11 RX ORDER — DEXMEDETOMIDINE HYDROCHLORIDE 100 UG/ML
INJECTION, SOLUTION INTRAVENOUS AS NEEDED
Status: DISCONTINUED | OUTPATIENT
Start: 2024-09-11 | End: 2024-09-11 | Stop reason: SURG

## 2024-09-11 RX ORDER — SODIUM CHLORIDE, SODIUM LACTATE, POTASSIUM CHLORIDE, CALCIUM CHLORIDE 600; 310; 30; 20 MG/100ML; MG/100ML; MG/100ML; MG/100ML
100 INJECTION, SOLUTION INTRAVENOUS ONCE
Status: DISCONTINUED | OUTPATIENT
Start: 2024-09-11 | End: 2024-09-11 | Stop reason: HOSPADM

## 2024-09-11 RX ORDER — NALOXONE HCL 0.4 MG/ML
0.1 VIAL (ML) INJECTION
Status: DISCONTINUED | OUTPATIENT
Start: 2024-09-11 | End: 2024-09-12 | Stop reason: HOSPADM

## 2024-09-11 RX ORDER — SODIUM CHLORIDE 0.9 % (FLUSH) 0.9 %
3 SYRINGE (ML) INJECTION EVERY 12 HOURS SCHEDULED
Status: DISCONTINUED | OUTPATIENT
Start: 2024-09-11 | End: 2024-09-12 | Stop reason: HOSPADM

## 2024-09-11 RX ORDER — EPHEDRINE SULFATE 50 MG/ML
INJECTION INTRAVENOUS AS NEEDED
Status: DISCONTINUED | OUTPATIENT
Start: 2024-09-11 | End: 2024-09-11 | Stop reason: SURG

## 2024-09-11 RX ORDER — FAMOTIDINE 10 MG/ML
20 INJECTION, SOLUTION INTRAVENOUS
Status: COMPLETED | OUTPATIENT
Start: 2024-09-11 | End: 2024-09-11

## 2024-09-11 RX ADMIN — BUPIVACAINE HYDROCHLORIDE 50 ML: 2.5 INJECTION, SOLUTION EPIDURAL; INFILTRATION; INTRACAUDAL; PERINEURAL at 09:25

## 2024-09-11 RX ADMIN — PHENYLEPHRINE HYDROCHLORIDE 50 MCG: 10 INJECTION INTRAVENOUS at 09:10

## 2024-09-11 RX ADMIN — MIDAZOLAM HYDROCHLORIDE 1 MG: 1 INJECTION, SOLUTION INTRAMUSCULAR; INTRAVENOUS at 08:53

## 2024-09-11 RX ADMIN — ACETAMINOPHEN 1000 MG: 500 TABLET ORAL at 08:03

## 2024-09-11 RX ADMIN — HYDROMORPHONE HYDROCHLORIDE 1 MG: 1 INJECTION, SOLUTION INTRAMUSCULAR; INTRAVENOUS; SUBCUTANEOUS at 10:57

## 2024-09-11 RX ADMIN — SODIUM CHLORIDE, POTASSIUM CHLORIDE, SODIUM LACTATE AND CALCIUM CHLORIDE 30 ML/HR: 600; 310; 30; 20 INJECTION, SOLUTION INTRAVENOUS at 08:03

## 2024-09-11 RX ADMIN — EPHEDRINE SULFATE 10 MG: 50 INJECTION INTRAVENOUS at 09:12

## 2024-09-11 RX ADMIN — ROCURONIUM 30 MG: 50 INJECTION, SOLUTION INTRAVENOUS at 09:12

## 2024-09-11 RX ADMIN — FAMOTIDINE 20 MG: 10 INJECTION, SOLUTION INTRAVENOUS at 08:03

## 2024-09-11 RX ADMIN — LIDOCAINE HYDROCHLORIDE 100 MG: 20 INJECTION, SOLUTION INFILTRATION; PERINEURAL at 09:01

## 2024-09-11 RX ADMIN — CHOLECALCIFEROL TAB 25 MCG (1000 UNIT) 1000 UNITS: 25 TAB at 13:34

## 2024-09-11 RX ADMIN — PHENYLEPHRINE HYDROCHLORIDE 100 MCG: 10 INJECTION INTRAVENOUS at 09:42

## 2024-09-11 RX ADMIN — Medication 3 ML: at 13:40

## 2024-09-11 RX ADMIN — CEFAZOLIN 2000 MG: 2 INJECTION, POWDER, FOR SOLUTION INTRAVENOUS at 16:50

## 2024-09-11 RX ADMIN — HYDROMORPHONE HYDROCHLORIDE 0.5 MG: 1 INJECTION, SOLUTION INTRAMUSCULAR; INTRAVENOUS; SUBCUTANEOUS at 11:23

## 2024-09-11 RX ADMIN — ONDANSETRON 4 MG: 2 INJECTION INTRAMUSCULAR; INTRAVENOUS at 08:03

## 2024-09-11 RX ADMIN — EPINEPHRINE 200 MCG: 1 INJECTION INTRAMUSCULAR; INTRAVENOUS; SUBCUTANEOUS at 09:25

## 2024-09-11 RX ADMIN — SUGAMMADEX 360 MG: 100 INJECTION, SOLUTION INTRAVENOUS at 10:14

## 2024-09-11 RX ADMIN — HYDROMORPHONE HYDROCHLORIDE 0.5 MG: 1 INJECTION, SOLUTION INTRAMUSCULAR; INTRAVENOUS; SUBCUTANEOUS at 11:11

## 2024-09-11 RX ADMIN — CEFAZOLIN 2000 MG: 2 INJECTION, POWDER, FOR SOLUTION INTRAVENOUS at 09:05

## 2024-09-11 RX ADMIN — ROCURONIUM 20 MG: 50 INJECTION, SOLUTION INTRAVENOUS at 09:44

## 2024-09-11 RX ADMIN — SUCCINYLCHOLINE CHLORIDE 120 MG: 20 INJECTION, SOLUTION INTRAMUSCULAR; INTRAVENOUS at 09:01

## 2024-09-11 RX ADMIN — LEVOTHYROXINE SODIUM 200 MCG: 150 TABLET ORAL at 13:34

## 2024-09-11 RX ADMIN — DEXAMETHASONE SODIUM PHOSPHATE 4 MG: 4 INJECTION, SOLUTION INTRAMUSCULAR; INTRAVENOUS at 08:03

## 2024-09-11 RX ADMIN — SODIUM CHLORIDE 100 ML/HR: 9 INJECTION, SOLUTION INTRAVENOUS at 13:39

## 2024-09-11 RX ADMIN — FENTANYL CITRATE 50 MCG: 50 INJECTION, SOLUTION INTRAMUSCULAR; INTRAVENOUS at 10:10

## 2024-09-11 RX ADMIN — DEXMEDETOMIDINE 50 MCG: 100 INJECTION, SOLUTION INTRAVENOUS at 09:25

## 2024-09-11 RX ADMIN — OXYCODONE HYDROCHLORIDE AND ACETAMINOPHEN 1 TABLET: 7.5; 325 TABLET ORAL at 19:14

## 2024-09-11 RX ADMIN — OXYCODONE HYDROCHLORIDE AND ACETAMINOPHEN 1 TABLET: 7.5; 325 TABLET ORAL at 13:34

## 2024-09-11 RX ADMIN — FENTANYL CITRATE 50 MCG: 50 INJECTION, SOLUTION INTRAMUSCULAR; INTRAVENOUS at 09:01

## 2024-09-11 RX ADMIN — PROPOFOL 130 MG: 10 INJECTION, EMULSION INTRAVENOUS at 09:01

## 2024-09-11 NOTE — PLAN OF CARE
Goal Outcome Evaluation:  Plan of Care Reviewed With: patient        Progress: improving  Outcome Evaluation: vss. O2 at 1L. s/p L partial nephrectomy, one lap site closed with glue, open to air. DAPHNEY drain with bloody output noted. pt on strict bed rest. caraballo to bedside drainage, straw colored. iv fluids in place. clear liquid diet. pain controlled with prn PO medication. pt currently resting in room. no other complaints at this time.

## 2024-09-11 NOTE — ANESTHESIA PROCEDURE NOTES
Peripheral Block    Pre-sedation assessment completed: 9/11/2024 9:00 AM    Patient reassessed immediately prior to procedure    Patient location during procedure: OR  Start time: 9/11/2024 9:10 AM  Stop time: 9/11/2024 9:25 AM  Reason for block: at surgeon's request and post-op pain management  Performed by  CRNA/CAA: Ricardo Mario, GELYNA: Pavel Cardona SRNA  Preanesthetic Checklist  Completed: patient identified, IV checked, site marked, risks and benefits discussed, surgical consent, monitors and equipment checked, pre-op evaluation and timeout performed  Prep:  Pt Position: supine  Sterile barriers:cap, gloves, mask and washed/disinfected hands  Prep: ChloraPrep  Patient monitoring: blood pressure monitoring, continuous pulse oximetry and EKG  Procedure    Sedation: yes  Performed under: general  Guidance:ultrasound guided    ULTRASOUND INTERPRETATION.  Using ultrasound guidance a 21 G gauge needle was placed in close proximity to the nerve, at which point, under ultrasound guidance anesthetic was injected in the area of the nerve and spread of the anesthesia was seen on ultrasound in close proximity thereto.  There were no abnormalities seen on ultrasound; a digital image was taken; and the patient tolerated the procedure with no complications. Images:still images obtained, printed/placed on chart    Laterality:left  Block Type:TAP  Injection Technique:single-shot  Needle Type:echogenic  Needle Gauge:21 G  Resistance on Injection: none    Medications Used: bupivacaine PF (MARCAINE) injection 0.25% - Injection   50 mL - 9/11/2024 9:25:00 AM      Medications  Preservative Free Saline:10ml  Comment:Left midaxillary TAP block + left external oblique intercostal block + 200 mcg epi + 50 mcg precedex     Post Assessment  Injection Assessment: negative aspiration for heme, no paresthesia on injection and incremental injection  Patient Tolerance:comfortable throughout block  Complications:no  Performed  by: Pavel Cardona, SRNA

## 2024-09-11 NOTE — ANESTHESIA POSTPROCEDURE EVALUATION
Patient: Gisela Martinez    Procedure Summary       Date: 09/11/24 Room / Location: [facility] OR 1 /  [facility] OR    Anesthesia Start: 0857 Anesthesia Stop: 1039    Procedure: LEFT LAPAROSCOPIC PARTIAL NEPHRECTOMY (Left: Abdomen) Diagnosis:       Renal mass, left      (Left Renal Mass)    Surgeons: Juan Francisco Carver MD Provider: Ricardo Mario CRNA    Anesthesia Type: general with block ASA Status: 2            Anesthesia Type: general with block    Vitals  Vitals Value Taken Time   /69 09/11/24 1125   Temp 97.5 °F (36.4 °C) 09/11/24 1045   Pulse 63 09/11/24 1127   Resp 15 09/11/24 1120   SpO2 92 % 09/11/24 1127   Vitals shown include unfiled device data.        Post Anesthesia Care and Evaluation    Patient location during evaluation: bedside  Patient participation: complete - patient participated  Level of consciousness: awake and alert  Pain score: 4  Pain management: adequate    Airway patency: patent  Anesthetic complications: No anesthetic complications  PONV Status: none  Cardiovascular status: acceptable  Respiratory status: acceptable  Hydration status: acceptable

## 2024-09-11 NOTE — ANESTHESIA PROCEDURE NOTES
Airway  Urgency: elective    Date/Time: 9/11/2024 9:04 AM  Airway not difficult    General Information and Staff    Patient location during procedure: OR    Indications and Patient Condition  Indications for airway management: airway protection    Preoxygenated: yes  Mask difficulty assessment: 0 - not attempted    Final Airway Details  Final airway type: endotracheal airway      Successful airway: ETT  Cuffed: yes   Successful intubation technique: direct laryngoscopy  Endotracheal tube insertion site: oral  Blade: Stewart  Blade size: 3  ETT size (mm): 7.0  Cormack-Lehane Classification: grade I - full view of glottis  Placement verified by: chest auscultation and capnometry   Cuff volume (mL): 10  Measured from: lips  ETT/EBT  to lips (cm): 21  Number of attempts at approach: 1  Assessment: lips, teeth, and gum same as pre-op and atraumatic intubation

## 2024-09-11 NOTE — OP NOTE
NEPHRECTOMY PARTIAL LAPAROSCOPIC  Procedure Note    Gisela Martinez  9/11/2024    Pre-op Diagnosis:   Left Renal Mass    Post-op Diagnosis:     Post-Op Diagnosis Codes:     * Renal mass, left [N28.89]    Procedure(s):  LEFT LAPAROSCOPIC PARTIAL NEPHRECTOMY    Surgeon(s):  Juan Francisco Carver MD    Anesthesia: General    Staff:   Circulator: Santa Hansen RN; Meliton Crum RN  Scrub Person: Katlin Banda; Yessy Richardson  Assistant: Deisy Ross CSA    Estimated Blood Loss: 400 mL    Specimens:                Order Name Source Comment Collection Info Order Time   TISSUE PATHOLOGY EXAM Kidney, Left  Collected By: Juan Francisco Carver MD 9/11/2024 10:01 AM     Release to patient   Routine Release              Drains:   Closed/Suction Drain Lateral LLQ Bulb 19 Fr. (Active)       Urethral Catheter Non-latex 16 Fr. (Active)       Findings: Exophytic mass left upper pole kidney.    Complications: None apparent    Indications: 59-year-old female with a 2-1/2 cm mass in the left upper pole kidney for laparoscopic retroperitoneal partial nephrectomy.    Procedure: Patient was taken the operative suite after informed sent was obtained.  She was given general anesthesia.  Perez catheter was placed.  A tap block was performed to the left flank.  She was then flipped over to the left flank up right lateral decubitus.  All layers were properly padded.  Axillary roll was placed.  She was secured to the table.  Table slightly flexed.  She was prepped and draped in a sterile fashion.  Surgical timeout was performed.  An incision was made off the tip the 12th rib and the retroperitoneal space was entered.  Balloon dissection was performed.  I placed a 5 mm trocar at the tip of the 11th rib.  A 5 mm trocar at the posterior axillary line above the 12th rib.  At the anterior axilla and above the iliac crest a 12 mm trocar was placed.  At the tip of the 12th rib a Prather balloon trocar was placed.   Pneumoretroperitoneum was now achieved.  Once we looked into the belly we could see that we had actually entered to the peritoneal cavity and there was a small rent that I had made.  I pulled out our Prather trocar and dissected the retroperitoneum limit further replaced that trocar and now I can get gas in the retroperitoneal space.  We had a small rent in the peritoneal cavity I could see in there but it was well away from our descending colon.  We dissected the Gerota's fascia open this up and identified the kidney.  I dissected out the upper pole the kidney until the mass was identified.  I freed this up from the remaining perirenal fat.  Using harmonic scalpel I then circumferentially dissected around the mass and I began cutting the mass out.  Once the mass was entirely cut out and I had good margins around and no violation of the mass we did getting into several large bleeding vessels which I was able cauterized a harmonic scalpel and then I placed our hook cautery and began cauterizing the edge of our dissection and then the base for these larger vessels were.  We probably lost about 400 cc total blood loss during the whole procedure we irrigated this out very well.  We placed some Floseal in the defect with some perirenal fat we watched it for about 10 minutes and could not appreciate any further bleeding.  A 19 Burkinan Torsten drain was placed through our 11th rib 5 mm trocar site secured with 2-0 silk.  The specimen was placed in a laparoscopic retrieval bag all of her trocars removed.  The specimen was removed.  The 2 larger trocar sites were closed with figure-of-eight 0 Vicryl.  The skin wounds were closed with 4-0 Vicryl subcuticular.  Marcaine was injected.  She was woken and taken to recovery in stable condition.  I discussed the findings with her spouse.  Assistant: Deisy Ross CSA was responsible for performing the following activities: Retraction, Suction, Irrigation, Suturing, Closing, Placing  Dressing, and Held/Positioned Camera and their skilled assistance was necessary for the success of this case.      Juan Francisco Carver MD     Date: 9/11/2024  Time: 10:33 EDT

## 2024-09-11 NOTE — H&P
[facility] Surgical History and Physical    Patient Care Team:  Zach Szymanski MD as PCP - General (Internal Medicine)  Zach Szymanski MD as Referring Physician (Internal Medicine)  Mike Marley MD as Consulting Physician (Hematology and Oncology)    Chief complaint kidney mass    Subjective     Patient is a 59 y.o. female presents with left upper pole partially exophytic mass measuring about 2.5 cm here for partial nephrectomy.     Review of Systems   Pertinent items are noted in HPI    Past Medical History:   Diagnosis Date    Adrenal adenoma     bilateral, having adrenal studies done     Anemia     Anxiety     Arthritis     COPD (chronic obstructive pulmonary disease)     GERD (gastroesophageal reflux disease)     Goiter     27 years ago    Hyperlipidemia     Hypothyroidism (acquired)     Radiation at age 27 years    PUD (peptic ulcer disease)     Seasonal allergies     Spinal stenosis     Syncope      Past Surgical History:   Procedure Laterality Date    ABDOMINAL SURGERY      Benign tumor on your left side    APPENDECTOMY      CARDIAC CATHETERIZATION N/A 10/06/2023    Procedure: Left Heart Cath;  Surgeon: Nancy Sylvester MD;  Location:  [facility];  Service: Cardiovascular;  Laterality: N/A;    CARDIAC CATHETERIZATION N/A 10/06/2023    Procedure: Coronary angiography;  Surgeon: Nancy Sylvester MD;  Location:  [facility];  Service: Cardiovascular;  Laterality: N/A;    CARDIAC CATHETERIZATION N/A 10/06/2023    Procedure: Left ventriculography;  Surgeon: Nancy Sylvester MD;  Location:  [facility];  Service: Cardiovascular;  Laterality: N/A;    CARPAL TUNNEL RELEASE Right 09/11/2018    Procedure: CARPAL TUNNEL RELEASE;  Surgeon: Chico Maddox MD;  Location: [facility] OR;  Service: Orthopedics    COLONOSCOPY N/A 06/19/2018    Procedure: COLONOSCOPY and polypectomy;  Surgeon: Anika Stephens DO;  Location: [facility] OR;  Service:  "Gastroenterology    COLONOSCOPY W/ POLYPECTOMY      ENDOSCOPY N/A 2018    Procedure: ESOPHAGOGASTRODUODENOSCOPY with ELLA test ;  Surgeon: Anika Stephens DO;  Location: [facility] OR;  Service: Gastroenterology    HYSTERECTOMY      LAPAROSCOPIC CHOLECYSTECTOMY      LAPAROSCOPIC CHOLECYSTECTOMY      OTHER SURGICAL HISTORY      nodule removed from larynx    OTHER SURGICAL HISTORY      radiated thyroid    TUBAL ABDOMINAL LIGATION       Family History   Problem Relation Age of Onset    COPD Mother     Breast cancer Mother     Anemia Mother     Hypertension Father     Colon cancer Father     Heart disease Father     Hypertension Sister     Cancer Sister     Colon polyps Sister     Malig Hyperthermia Neg Hx      Social History     Tobacco Use    Smoking status: Former     Current packs/day: 0.00     Average packs/day: 1 pack/day for 20.0 years (20.0 ttl pk-yrs)     Types: Cigarettes     Start date:      Quit date:      Years since quittin.6    Smokeless tobacco: Never    Tobacco comments:     quit 5 months ago   Vaping Use    Vaping status: Never Used   Substance Use Topics    Alcohol use: Not Currently     Comment: social, once/year    Drug use: No       Meds:  Medications Prior to Admission   Medication Sig Dispense Refill Last Dose    atorvastatin (LIPITOR) 80 MG tablet Take 1 tablet by mouth Every Night.       cholecalciferol 25 MCG tablet Take 1 tablet by mouth Daily.       citalopram (CeleXA) 20 MG tablet Take 1 tablet by mouth Daily.       levothyroxine (Synthroid) 200 MCG tablet Take 1 tablet by mouth Daily. Patient is taking the brand Synthroid and not the generic          Allergies:  Venomil honey bee venom [honey bee venom] and Penicillins    Debilities:  None    Objective     Vital Signs  Temp:  [97.9 °F (36.6 °C)] 97.9 °F (36.6 °C)  No intake or output data in the 24 hours ending 24 0755  Flowsheet Rows      Flowsheet Row First Filed Value   Admission Height 167.6 cm (66\") Documented " at 09/11/2024 0751   Admission Weight 89.4 kg (197 lb) Documented at 09/11/2024 0751             Physical Exam:     General Appearance:     Alert, cooperative, NAD   HEENT:     No trauma, pupils reactive, hearing intact   Back:      No CVA tenderness   Lungs:      Respirations unlabored, no wheezing    Heart:     RRR, intact peripheral pulses   Abdomen:      Soft, NDNT, no masses, no guarding   :     Genitalia normal   Extremities:    No edema, no deformity   Lymphatic:    No neck or groin LAD   Skin:    No bleeding, bruising or rashes   Neuro/Psych:    Orientation intact, mood/affect pleasant, no focal findings     Results Review:     Results for orders placed or performed in visit on 09/04/24   Basic Metabolic Panel    Specimen: Blood   Result Value Ref Range    Glucose 92 65 - 99 mg/dL    BUN 17 6 - 20 mg/dL    Creatinine 0.87 0.57 - 1.00 mg/dL    Sodium 142 136 - 145 mmol/L    Potassium 4.6 3.5 - 5.2 mmol/L    Chloride 105 98 - 107 mmol/L    CO2 25.8 22.0 - 29.0 mmol/L    Calcium 9.5 8.6 - 10.5 mg/dL    BUN/Creatinine Ratio 19.5 7.0 - 25.0    Anion Gap 11.2 5.0 - 15.0 mmol/L    eGFR 77.3 >60.0 mL/min/1.73   CBC (No Diff)    Specimen: Blood   Result Value Ref Range    WBC 11.43 (H) 3.40 - 10.80 10*3/mm3    RBC 5.25 3.77 - 5.28 10*6/mm3    Hemoglobin 14.2 12.0 - 15.9 g/dL    Hematocrit 45.3 34.0 - 46.6 %    MCV 86.3 79.0 - 97.0 fL    MCH 27.0 26.6 - 33.0 pg    MCHC 31.3 (L) 31.5 - 35.7 g/dL    RDW 15.4 12.3 - 15.4 %    RDW-SD 48.6 37.0 - 54.0 fl    MPV 10.4 6.0 - 12.0 fL    Platelets 381 140 - 450 10*3/mm3       I reviewed the patient's prior history and old records to the best of my ability.   I have personally reviewed all pertinent imaging myself and their accompanying reports.   I have reviewed all labs.     Assessment:  Left upper pole renal mass    Plan:    Left retroperitoneal upper pole partial nephrectomy    I discussed the patient's findings and my recommendations with patient.   Risks, complications,  outcomes and alternatives discussed with the patient at the bedside and office.    Juan Francisco Carver MD  09/11/24  07:55 EDT

## 2024-09-11 NOTE — ANESTHESIA PREPROCEDURE EVALUATION
Anesthesia Evaluation     Patient summary reviewed and Nursing notes reviewed   no history of anesthetic complications:   NPO Solid Status: > 8 hours  NPO Liquid Status: > 8 hours           Airway   Mallampati: II  TM distance: >3 FB  Neck ROM: full  No difficulty expected  Dental    (+) upper dentures    Pulmonary - normal exam   (+) a smoker (1 ppd) Current, cigarettes, COPD (no inhaler use) mild,  (-) shortness of breath  Cardiovascular - normal exam  Exercise tolerance: good (4-7 METS)    ECG reviewed    (+) hyperlipidemia    ROS comment: 9/2023 Stress Test  ·  Left ventricular ejection fraction is normal (Calculated EF = 60%).  ·  Myocardial perfusion imaging indicates a located in the anterior wall, apex and septal wall.  ·  Impressions are consistent with an intermediate risk study.  ·  Findings consistent with an equivocal ECG stress test.    ECG 12 Lead     Date/Time: 9/12/2023 3:34 PM  Performed by: Nancy Sylvester MD  Authorized by: Nancy Sylvester MD   Comparison: not compared with previous ECG   Previous ECG: no previous ECG available  Rhythm: sinus rhythm     Clinical impression: non-specific ECG    Cath 2023  ·  Successful right and left coronary angiogram and LV gram  ·  Normal coronary arteries  ·  Normal LV gram        Neuro/Psych  (+) dizziness/light headedness (d/t hypoglycemia per patient), psychiatric history Anxiety  GI/Hepatic/Renal/Endo    (+) obesity, thyroid problem hypothyroidismRenal disease: renal mass. Diabetes: hypoglycemia.    Musculoskeletal (-) negative ROS    Abdominal  - normal exam   Substance History - negative use     OB/GYN negative ob/gyn ROS         Other - negative ROS                     Anesthesia Plan    ASA 2     general with block     (Consents to intercostal oblique plane block and/or TAP blocks. )  intravenous induction     Anesthetic plan, risks, benefits, and alternatives have been provided, discussed and informed consent has been obtained with:  patient and spouse/significant other.  Pre-procedure education provided  Use of blood products discussed with patient and spouse/significant other  Consented to blood products.    Plan discussed with CRNA.      CODE STATUS:

## 2024-09-12 VITALS
DIASTOLIC BLOOD PRESSURE: 72 MMHG | OXYGEN SATURATION: 92 % | BODY MASS INDEX: 31.66 KG/M2 | TEMPERATURE: 97.8 F | HEIGHT: 66 IN | SYSTOLIC BLOOD PRESSURE: 153 MMHG | HEART RATE: 65 BPM | WEIGHT: 197 LBS | RESPIRATION RATE: 18 BRPM

## 2024-09-12 PROBLEM — Z90.5 S/P NEPHRECTOMY: Status: ACTIVE | Noted: 2024-09-12

## 2024-09-12 PROBLEM — N28.89 RENAL MASS: Status: ACTIVE | Noted: 2024-09-12

## 2024-09-12 LAB
ANION GAP SERPL CALCULATED.3IONS-SCNC: 8.9 MMOL/L (ref 5–15)
BUN SERPL-MCNC: 13 MG/DL (ref 6–20)
BUN/CREAT SERPL: 15.1 (ref 7–25)
CALCIUM SPEC-SCNC: 8.6 MG/DL (ref 8.6–10.5)
CHLORIDE SERPL-SCNC: 104 MMOL/L (ref 98–107)
CO2 SERPL-SCNC: 23.1 MMOL/L (ref 22–29)
CREAT FLD-MCNC: 36.71 MG/DL
CREAT SERPL-MCNC: 0.86 MG/DL (ref 0.57–1)
CYTO UR: NORMAL
DEPRECATED RDW RBC AUTO: 48.4 FL (ref 37–54)
EGFRCR SERPLBLD CKD-EPI 2021: 78.4 ML/MIN/1.73
ERYTHROCYTE [DISTWIDTH] IN BLOOD BY AUTOMATED COUNT: 15.5 % (ref 12.3–15.4)
GLUCOSE SERPL-MCNC: 126 MG/DL (ref 65–99)
HCT VFR BLD AUTO: 38.6 % (ref 34–46.6)
HGB BLD-MCNC: 12.2 G/DL (ref 12–15.9)
LAB AP CASE REPORT: NORMAL
LAB AP DIAGNOSIS COMMENT: NORMAL
LAB AP SYNOPTIC CHECKLIST: NORMAL
MCH RBC QN AUTO: 27.1 PG (ref 26.6–33)
MCHC RBC AUTO-ENTMCNC: 31.6 G/DL (ref 31.5–35.7)
MCV RBC AUTO: 85.8 FL (ref 79–97)
PATH REPORT.FINAL DX SPEC: NORMAL
PATH REPORT.GROSS SPEC: NORMAL
PLATELET # BLD AUTO: 322 10*3/MM3 (ref 140–450)
PMV BLD AUTO: 10.7 FL (ref 6–12)
POTASSIUM SERPL-SCNC: 4.3 MMOL/L (ref 3.5–5.2)
RBC # BLD AUTO: 4.5 10*6/MM3 (ref 3.77–5.28)
SODIUM SERPL-SCNC: 136 MMOL/L (ref 136–145)
WBC NRBC COR # BLD AUTO: 18.48 10*3/MM3 (ref 3.4–10.8)

## 2024-09-12 PROCEDURE — G0378 HOSPITAL OBSERVATION PER HR: HCPCS

## 2024-09-12 PROCEDURE — 82570 ASSAY OF URINE CREATININE: CPT | Performed by: UROLOGY

## 2024-09-12 PROCEDURE — 25810000003 SODIUM CHLORIDE 0.9 % SOLUTION: Performed by: UROLOGY

## 2024-09-12 PROCEDURE — 80048 BASIC METABOLIC PNL TOTAL CA: CPT | Performed by: UROLOGY

## 2024-09-12 PROCEDURE — 25010000002 HYDROMORPHONE 1 MG/ML SOLUTION: Performed by: UROLOGY

## 2024-09-12 PROCEDURE — 25010000002 CEFAZOLIN PER 500 MG: Performed by: UROLOGY

## 2024-09-12 PROCEDURE — 85027 COMPLETE CBC AUTOMATED: CPT | Performed by: UROLOGY

## 2024-09-12 RX ORDER — OXYCODONE AND ACETAMINOPHEN 7.5; 325 MG/1; MG/1
1 TABLET ORAL EVERY 4 HOURS PRN
Qty: 30 TABLET | Refills: 0 | Status: SHIPPED | OUTPATIENT
Start: 2024-09-12 | End: 2024-09-19

## 2024-09-12 RX ORDER — HYDROMORPHONE HYDROCHLORIDE 2 MG/1
2 TABLET ORAL
Status: DISCONTINUED | OUTPATIENT
Start: 2024-09-12 | End: 2024-09-12 | Stop reason: HOSPADM

## 2024-09-12 RX ORDER — AMOXICILLIN 250 MG
2 CAPSULE ORAL DAILY
Qty: 60 TABLET | Refills: 0 | Status: SHIPPED | OUTPATIENT
Start: 2024-09-12 | End: 2024-10-12

## 2024-09-12 RX ADMIN — HYDROMORPHONE HYDROCHLORIDE 1 MG: 1 INJECTION, SOLUTION INTRAMUSCULAR; INTRAVENOUS; SUBCUTANEOUS at 03:01

## 2024-09-12 RX ADMIN — Medication 3 ML: at 09:02

## 2024-09-12 RX ADMIN — LEVOTHYROXINE SODIUM 200 MCG: 150 TABLET ORAL at 09:02

## 2024-09-12 RX ADMIN — SENNOSIDES AND DOCUSATE SODIUM 2 TABLET: 50; 8.6 TABLET ORAL at 09:02

## 2024-09-12 RX ADMIN — SODIUM CHLORIDE 100 ML/HR: 9 INJECTION, SOLUTION INTRAVENOUS at 00:40

## 2024-09-12 RX ADMIN — OXYCODONE HYDROCHLORIDE AND ACETAMINOPHEN 1 TABLET: 7.5; 325 TABLET ORAL at 11:08

## 2024-09-12 RX ADMIN — CEFAZOLIN 2000 MG: 2 INJECTION, POWDER, FOR SOLUTION INTRAVENOUS at 01:29

## 2024-09-12 RX ADMIN — CHOLECALCIFEROL TAB 25 MCG (1000 UNIT) 1000 UNITS: 25 TAB at 09:01

## 2024-09-12 NOTE — PLAN OF CARE
Goal Outcome Evaluation:              Outcome Evaluation: pt has 3 lap sites closed with glue and open to air. DAPHNEY drain with bloody output noted. Perez to be removed at 0600am as ordered. Pt continues to tolerate her clear liquid diet. Pt did have some break through pain tonight and was give a dose of IV pain medication. Pt's vitals remain stable at this time. Pt does not have any other complaints at this time.

## 2024-09-12 NOTE — PROGRESS NOTES
Patient: Gisela Martinez  Procedure(s):  LEFT LAPAROSCOPIC PARTIAL NEPHRECTOMY  Anesthesia type: general with block    Patient location: [facility] Surgical Floor  Vitals:    09/11/24 2331 09/12/24 0456 09/12/24 0730 09/12/24 1120   BP: 118/58 101/53 123/62 134/72   BP Location: Left arm Left arm Left arm Left arm   Patient Position: Lying Lying Lying Sitting   Pulse: 70 71 70 72   Resp: 16 18 18 18   Temp: 98.9 °F (37.2 °C) 98.3 °F (36.8 °C) 98 °F (36.7 °C) 97.5 °F (36.4 °C)   TempSrc: Oral Oral Oral Oral   SpO2: 94%  95% 98%   Weight:       Height:         Level of consciousness: awake, alert, and oriented    Post-anesthesia pain: adequate analgesia  Airway patency: patent  Respiratory: unassisted  Cardiovascular: stable and blood pressure at baseline  Hydration: euvolemic    Anesthetic complications: no

## 2024-09-12 NOTE — CASE MANAGEMENT/SOCIAL WORK
Continued Stay Note  LORIE Hilton     Patient Name: Gisela Martinez  MRN: [id number]  Today's Date: 9/12/2024    Admit Date: 9/11/2024    Plan: Plan home with    Discharge Plan       Row Name 09/12/24 1551       Plan    Plan Plan home with     Patient/Family in Agreement with Plan yes    Plan Comments Spoke with patient at bedside, face sheet verified. Patient lives in a home with her , her granddaughter has an upstairs apartment and is able to assist as needed. Patient is independent of ADLs but does not drive.Family provides transportation.  She states she uses no DME, family has just purchased a lift chair for her to use. She has not  used HH or been to inpatient rehab previously. She does not have a living will. SHe sees Dr Szymanski as PCP and uses [facility] pharmacy [place]. There are no issues obtaining medications. There are no concerrns r/t food, housing, utilities or transportation.                   Discharge Codes    No documentation.                       Néstor Conn RN

## 2024-09-12 NOTE — DISCHARGE SUMMARY
Date of Discharge:  09/12/2024    Discharge Diagnosis: Renal mass pathology pending    Presenting Problem/History of Present Illness  S/p nephrectomy [Z90.5]  Renal mass [N28.89]     59-year-old female with a left renal solid mass for partial nephrectomy.  Hospital Course  Patient is a 59 y.o. female presented with left renal mass.  She had a retroperitoneal blood pressure nephrectomy.  She is doing well.  She has had some increasing drain output.  This was sent off this morning for DAPHNEY creatinine which came back at 38 so likely she has little bit of a urine leak.  She be sent home with her drain.  Her pathology is pending.  Perez is out she is voiding well.  Labs are stable..      Procedures Performed  Procedure(s):  LEFT LAPAROSCOPIC PARTIAL NEPHRECTOMY       Consults:   Consults       No orders found for last 30 day(s).            Pertinent Test Results: labs: D.  Pathology pending      Condition on Discharge: Good.    Vital Signs  Temp:  [97.5 °F (36.4 °C)-98.9 °F (37.2 °C)] 97.8 °F (36.6 °C)  Heart Rate:  [65-72] 65  Resp:  [16-19] 18  BP: (101-153)/(53-72) 153/72    Physical Exam:   Is healing well.    Discharge Disposition  Home or Self Care    Discharge Medications     Discharge Medications        New Medications        Instructions Start Date   oxyCODONE-acetaminophen 7.5-325 MG per tablet  Commonly known as: PERCOCET   1 tablet, Oral, Every 4 Hours PRN      sennosides-docusate 8.6-50 MG per tablet  Commonly known as: PERICOLACE   2 tablets, Oral, Daily             Continue These Medications        Instructions Start Date   atorvastatin 80 MG tablet  Commonly known as: LIPITOR   80 mg, Oral, Nightly      cholecalciferol 25 MCG (1000 UT) tablet  Commonly known as: VITAMIN D3   1 tablet, Oral, Daily      citalopram 20 MG tablet  Commonly known as: CeleXA   20 mg, Oral, Daily      Synthroid 200 MCG tablet  Generic drug: levothyroxine   200 mcg, Oral, Daily, Patient is taking the brand Synthroid and not the  generic               Discharge Diet: Regular    Activity at Discharge: Strict activity for 2-week    Follow-up Appointments  Future Appointments   Date Time Provider Department Center   10/8/2024  2:15 PM Nancy Sylvester MD [facility]     Additional Instructions for the Follow-ups that You Need to Schedule       Discharge Follow-up with Specified Provider: Dr Juan Francisco Carver; 1 Week   As directed      To: Dr Juan Francisco Craver   Follow Up: 1 Week                Test Results Pending at Discharge       Juan Francisco Carver MD  09/12/24  18:28 EDT    Time: Discharge 15 min

## 2024-09-12 NOTE — PROGRESS NOTES
Postop day #1 laparoscopic left partial nephrectomy 400 cc blood loss    Alert no complaints request increasing ambulation no nausea or vomiting    Wounds are clean she has had a 70 cc out of her drain serosanguineous abdomen is soft    Lab elevated white count of 18,000 hemoglobin of 12.2 creatinine level 0.86    Increased ambulation DAPHNEY drainage for creatinine level before possible removal and reassessment for discharge later today or tomorrow discussed with patient

## 2024-09-13 ENCOUNTER — READMISSION MANAGEMENT (OUTPATIENT)
Dept: CALL CENTER | Facility: HOSPITAL | Age: 59
End: 2024-09-13
Payer: COMMERCIAL

## 2024-09-13 NOTE — OUTREACH NOTE
Prep Survey      Flowsheet Row Responses   [facility] facility patient discharged from? [place]   Is LACE score < 7 ? Yes   Eligibility Readm Mgmt   Discharge diagnosis S/p nephrectomy   Does the patient have one of the following disease processes/diagnoses(primary or secondary)? General Surgery   Prep survey completed? Yes            Azucena DIALLO - Registered Nurse

## 2024-09-13 NOTE — CASE MANAGEMENT/SOCIAL WORK
Case Management Discharge Note      Final Note: Discharged home.         Selected Continued Care - Discharged on 9/12/2024 Admission date: 9/11/2024 - Discharge disposition: Home or Self Care      Destination    No services have been selected for the patient.                Durable Medical Equipment    No services have been selected for the patient.                Dialysis/Infusion    No services have been selected for the patient.                Home Medical Care    No services have been selected for the patient.                Therapy    No services have been selected for the patient.                Community Resources    No services have been selected for the patient.                Community & DME    No services have been selected for the patient.                         Final Discharge Disposition Code: 01 - home or self-care

## 2024-09-26 ENCOUNTER — TELEPHONE (OUTPATIENT)
Dept: ONCOLOGY | Facility: CLINIC | Age: 59
End: 2024-09-26
Payer: COMMERCIAL

## 2024-10-03 ENCOUNTER — TRANSCRIBE ORDERS (OUTPATIENT)
Dept: ADMINISTRATIVE | Facility: HOSPITAL | Age: 59
End: 2024-10-03
Payer: COMMERCIAL

## 2024-10-03 DIAGNOSIS — N28.89 RENAL MASS: Primary | ICD-10-CM

## 2024-10-14 NOTE — PROGRESS NOTES
Subjective     REASON FOR CONSULTATION:  lymphocytosis  Provide an opinion on any further workup or treatment                             REQUESTING PHYSICIAN:  Stephon    RECORDS OBTAINED:  Records of the patients history including those obtained from the referring provider were reviewed and summarized in detail.    HISTORY OF PRESENT ILLNESS:  The patient is a 59 y.o. year old female who is here for an opinion about the above issue.    History of Present Illness   This is a very pleasant 59-year-old woman who is referred from her primary care provider for evaluation of lymphocytosis.  Reviewing previous lab 10/4/2023 WBC 12.5, hemoglobin 14.3, platelets 373 and white cell differential normal except for increased lymphocytes 4.76.  A peripheral blood flow cytometry was negative for abnormal B or T-cell populations.    The patient is a former smoker and quit in early 2024.  I ordered a CT of the chest abdomen pelvis 7/19/2024 which showed no intrathoracic abnormalities, a left renal mass 2.7 cm concerning for RCC, bilateral adrenal adenomas.  An MRI of the abdomen was performed subsequent again showing a 2.8 x 2.7 x 2.4 cm enhancing left renal mass consistent with RCC.  There was no extension beyond the perirenal fascia or collecting system involvement.  There were bilateral adrenal gland lesions up to 3.8 cm on the right.    The patient was referred to urology and underwent on 9/11/2024 a partial laparoscopic left nephrectomy.  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    The patient is doing well since surgery but complains of significant fatigue.  She is smoking again.    Past Medical History:   Diagnosis Date    Adrenal adenoma     bilateral, having adrenal studies done     Anemia     Anxiety     Arthritis     COPD (chronic obstructive pulmonary disease)     GERD (gastroesophageal reflux disease)      Goiter     27 years ago    Hyperlipidemia     Hypothyroidism (acquired)     Radiation at age 27 years    PUD (peptic ulcer disease)     Seasonal allergies     Spinal stenosis     Syncope         Past Surgical History:   Procedure Laterality Date    ABDOMINAL SURGERY      Benign tumor on your left side    APPENDECTOMY      CARDIAC CATHETERIZATION N/A 10/06/2023    Procedure: Left Heart Cath;  Surgeon: Nancy Sylvester MD;  Location:  [facility];  Service: Cardiovascular;  Laterality: N/A;    CARDIAC CATHETERIZATION N/A 10/06/2023    Procedure: Coronary angiography;  Surgeon: Nancy Sylvester MD;  Location:  [facility];  Service: Cardiovascular;  Laterality: N/A;    CARDIAC CATHETERIZATION N/A 10/06/2023    Procedure: Left ventriculography;  Surgeon: Nancy Sylvester MD;  Location:  [facility];  Service: Cardiovascular;  Laterality: N/A;    CARPAL TUNNEL RELEASE Right 09/11/2018    Procedure: CARPAL TUNNEL RELEASE;  Surgeon: Chico Maddox MD;  Location: [facility] OR;  Service: Orthopedics    COLONOSCOPY N/A 06/19/2018    Procedure: COLONOSCOPY and polypectomy;  Surgeon: Anika Stephens DO;  Location: [facility] OR;  Service: Gastroenterology    COLONOSCOPY W/ POLYPECTOMY      ENDOSCOPY N/A 06/19/2018    Procedure: ESOPHAGOGASTRODUODENOSCOPY with ELLA test ;  Surgeon: Anika Stephens DO;  Location: [facility] OR;  Service: Gastroenterology    HYSTERECTOMY      LAPAROSCOPIC CHOLECYSTECTOMY      LAPAROSCOPIC CHOLECYSTECTOMY      NEPHRECTOMY PARTIAL Left 9/11/2024    Procedure: LEFT LAPAROSCOPIC PARTIAL NEPHRECTOMY;  Surgeon: Juan Francisco Carver MD;  Location: [facility] OR;  Service: Urology;  Laterality: Left;    OTHER SURGICAL HISTORY      nodule removed from larynx    OTHER SURGICAL HISTORY      radiated thyroid    TUBAL ABDOMINAL LIGATION          Current Outpatient Medications on File Prior to Visit   Medication Sig Dispense Refill    atorvastatin  (LIPITOR) 80 MG tablet Take 1 tablet by mouth Every Night.      cholecalciferol 25 MCG tablet Take 1 tablet by mouth Daily.      escitalopram (LEXAPRO) 20 MG tablet Take 1 tablet by mouth Daily.      levothyroxine (Synthroid) 200 MCG tablet Take 1 tablet by mouth Daily. Patient is taking the brand Synthroid and not the generic      [DISCONTINUED] citalopram (CeleXA) 20 MG tablet Take 1 tablet by mouth Daily.       No current facility-administered medications on file prior to visit.        ALLERGIES:    Allergies   Allergen Reactions    Venomil Honey Bee Venom [Honey Bee Venom] Anaphylaxis    Penicillins Unknown (See Comments)     Anaphylaxis          Social History     Socioeconomic History    Marital status:     Number of children: 3   Tobacco Use    Smoking status: Former     Current packs/day: 0.00     Average packs/day: 1 pack/day for 20.0 years (20.0 ttl pk-yrs)     Types: Cigarettes     Start date:      Quit date:      Years since quittin.7    Smokeless tobacco: Never    Tobacco comments:     quit 5 months ago   Vaping Use    Vaping status: Never Used   Substance and Sexual Activity    Alcohol use: Not Currently     Comment: social, once/year    Drug use: No    Sexual activity: Defer     Partners: Male     Birth control/protection: Hysterectomy        Family History   Problem Relation Age of Onset    COPD Mother     Breast cancer Mother     Anemia Mother     Hypertension Father     Colon cancer Father     Heart disease Father     Hypertension Sister     Cancer Sister     Colon polyps Sister     Malig Hyperthermia Neg Hx         Review of Systems   Constitutional:  Positive for fatigue and unexpected weight change (Weight gain). Negative for fever.   HENT: Negative.     Respiratory:  Positive for cough, chest tightness and shortness of breath. Negative for wheezing.    Cardiovascular: Negative.    Gastrointestinal:  Negative for constipation and diarrhea.   Genitourinary:  Negative for  "hematuria.   Musculoskeletal:  Positive for arthralgias.   Skin: Negative.    Hematological: Negative.    Psychiatric/Behavioral: Negative.            Objective     Vitals:    10/16/24 0835   BP: 115/73   Pulse: 67   Resp: 16   Temp: 97.7 °F (36.5 °C)   TempSrc: Infrared   SpO2: 97%   Weight: 90.2 kg (198 lb 12.8 oz)   Height: 167.6 cm (65.98\")   PainSc:   4   PainLoc: Generalized  Comment: surgery site           10/16/2024     8:39 AM   Current Status   ECOG score 0       Physical Exam    CONSTITUTIONAL: pleasant well-developed adult woman  HEENT: no icterus, no thrush, moist membranes  LYMPH: no cervical or supraclavicular lad  CV: RRR, S1S2, no murmur  RESP: cta bilat, coarse bs  GI: soft, non-tender, no splenomegaly, +bs  MUSC: no edema, normal gait  NEURO: alert and oriented x3, normal strength  PSYCH: normal mood and affect  Unchanged-10/16/2024    RECENT LABS:  Hematology WBC   Date Value Ref Range Status   09/12/2024 18.48 (H) 3.40 - 10.80 10*3/mm3 Final     RBC   Date Value Ref Range Status   09/12/2024 4.50 3.77 - 5.28 10*6/mm3 Final     Hemoglobin   Date Value Ref Range Status   09/12/2024 12.2 12.0 - 15.9 g/dL Final     Hematocrit   Date Value Ref Range Status   09/12/2024 38.6 34.0 - 46.6 % Final     Platelets   Date Value Ref Range Status   09/12/2024 322 140 - 450 10*3/mm3 Final        Lab Results   Component Value Date    GLUCOSE 126 (H) 09/12/2024    BUN 13 09/12/2024    CREATININE 0.86 09/12/2024    EGFR 78.4 09/12/2024    BCR 15.1 09/12/2024    K 4.3 09/12/2024    CO2 23.1 09/12/2024    CALCIUM 8.6 09/12/2024    ALBUMIN 4.2 07/10/2024    BILITOT 0.3 07/10/2024    AST 17 07/10/2024    ALT 21 07/10/2024       Assessment & Plan     *Persistent lymphocytosis  The patient has had mild increased lymphocyte count since October 2023 3.7-4.76 probable reactive but need to rule out a lymphoproliferative population  7/10/2024 peripheral blood flow cytometry was negative for abnormal B or T-cell " populations.  *Cough/shortness of air/chest tightness, history of lung nodule/microscopic hematuria/pelvic pain  CT of the chest abdomen pelvis 7/19/2024 which showed no intrathoracic abnormalities, a left renal mass 2.7 cm concerning for RCC, bilateral adrenal adenomas.    MRI of the abdomen was performed subsequent again showing a 2.8 x 2.7 x 2.4 cm enhancing left renal mass consistent with RCC.  There was no extension beyond the perirenal fascia or collecting system involvement.  There were bilateral adrenal gland lesions up to 3.8 cm on the right.  9/11/2024 a partial laparoscopic left nephrectomy.  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  UISS prognostic score intermediate-no indication for adjuvant immunotherapy/TKI therapy for stage of disease  *History of tobacco abuse-previously quit but now smoking again  *Fatigue    Oncology/hematology plan/recommendations:  Additional labs today for fatigue-CBC CMP ferritin iron profile LDH ESR vitamin D cortisol  Endocrinology referral for evaluation of adrenal nodule  Follow-up 6 months after surveillance scans ordered by urology with CBC CMP  Advised tobacco cessation

## 2024-10-16 ENCOUNTER — OFFICE VISIT (OUTPATIENT)
Dept: ONCOLOGY | Facility: CLINIC | Age: 59
End: 2024-10-16
Payer: COMMERCIAL

## 2024-10-16 ENCOUNTER — LAB (OUTPATIENT)
Dept: LAB | Facility: HOSPITAL | Age: 59
End: 2024-10-16
Payer: COMMERCIAL

## 2024-10-16 VITALS
HEIGHT: 66 IN | TEMPERATURE: 97.7 F | DIASTOLIC BLOOD PRESSURE: 73 MMHG | WEIGHT: 198.8 LBS | OXYGEN SATURATION: 97 % | SYSTOLIC BLOOD PRESSURE: 115 MMHG | RESPIRATION RATE: 16 BRPM | BODY MASS INDEX: 31.95 KG/M2 | HEART RATE: 67 BPM

## 2024-10-16 DIAGNOSIS — C64.2 CANCER OF LEFT KIDNEY: ICD-10-CM

## 2024-10-16 DIAGNOSIS — E27.9 ADRENAL NODULE: ICD-10-CM

## 2024-10-16 DIAGNOSIS — R53.83 OTHER FATIGUE: Primary | ICD-10-CM

## 2024-10-16 LAB
ALBUMIN SERPL-MCNC: 4.5 G/DL (ref 3.5–5.2)
ALBUMIN/GLOB SERPL: 1.5 G/DL
ALP SERPL-CCNC: 136 U/L (ref 39–117)
ALT SERPL W P-5'-P-CCNC: 18 U/L (ref 1–33)
ANION GAP SERPL CALCULATED.3IONS-SCNC: 11 MMOL/L (ref 5–15)
AST SERPL-CCNC: 17 U/L (ref 1–32)
BASOPHILS # BLD AUTO: 0.1 10*3/MM3 (ref 0–0.2)
BASOPHILS NFR BLD AUTO: 0.8 % (ref 0–1.5)
BILIRUB SERPL-MCNC: 0.4 MG/DL (ref 0–1.2)
BUN SERPL-MCNC: 13 MG/DL (ref 6–20)
BUN/CREAT SERPL: 15.1 (ref 7–25)
CALCIUM SPEC-SCNC: 9.9 MG/DL (ref 8.6–10.5)
CHLORIDE SERPL-SCNC: 107 MMOL/L (ref 98–107)
CO2 SERPL-SCNC: 26 MMOL/L (ref 22–29)
CORTIS SERPL-MCNC: 13 MCG/DL
CREAT SERPL-MCNC: 0.86 MG/DL (ref 0.57–1)
DEPRECATED RDW RBC AUTO: 49.1 FL (ref 37–54)
EGFRCR SERPLBLD CKD-EPI 2021: 78.4 ML/MIN/1.73
EOSINOPHIL # BLD AUTO: 0.26 10*3/MM3 (ref 0–0.4)
EOSINOPHIL NFR BLD AUTO: 2.1 % (ref 0.3–6.2)
ERYTHROCYTE [DISTWIDTH] IN BLOOD BY AUTOMATED COUNT: 15.5 % (ref 12.3–15.4)
ERYTHROCYTE [SEDIMENTATION RATE] IN BLOOD: 18 MM/HR (ref 0–30)
FERRITIN SERPL-MCNC: 49.8 NG/ML (ref 13–150)
GLOBULIN UR ELPH-MCNC: 3 GM/DL
GLUCOSE SERPL-MCNC: 114 MG/DL (ref 65–99)
HCT VFR BLD AUTO: 42.6 % (ref 34–46.6)
HGB BLD-MCNC: 13.3 G/DL (ref 12–15.9)
IMM GRANULOCYTES # BLD AUTO: 0.09 10*3/MM3 (ref 0–0.05)
IMM GRANULOCYTES NFR BLD AUTO: 0.7 % (ref 0–0.5)
IRON 24H UR-MRATE: 37 MCG/DL (ref 37–145)
IRON SATN MFR SERPL: 8 % (ref 20–50)
LDH SERPL-CCNC: 150 U/L (ref 135–214)
LYMPHOCYTES # BLD AUTO: 3.34 10*3/MM3 (ref 0.7–3.1)
LYMPHOCYTES NFR BLD AUTO: 27.2 % (ref 19.6–45.3)
MCH RBC QN AUTO: 27.1 PG (ref 26.6–33)
MCHC RBC AUTO-ENTMCNC: 31.2 G/DL (ref 31.5–35.7)
MCV RBC AUTO: 86.8 FL (ref 79–97)
MONOCYTES # BLD AUTO: 0.74 10*3/MM3 (ref 0.1–0.9)
MONOCYTES NFR BLD AUTO: 6 % (ref 5–12)
NEUTROPHILS NFR BLD AUTO: 63.2 % (ref 42.7–76)
NEUTROPHILS NFR BLD AUTO: 7.75 10*3/MM3 (ref 1.7–7)
NRBC BLD AUTO-RTO: 0 /100 WBC (ref 0–0.2)
PLATELET # BLD AUTO: 351 10*3/MM3 (ref 140–450)
PMV BLD AUTO: 10.1 FL (ref 6–12)
POTASSIUM SERPL-SCNC: 4.4 MMOL/L (ref 3.5–5.2)
PROT SERPL-MCNC: 7.5 G/DL (ref 6–8.5)
RBC # BLD AUTO: 4.91 10*6/MM3 (ref 3.77–5.28)
SODIUM SERPL-SCNC: 144 MMOL/L (ref 136–145)
TIBC SERPL-MCNC: 456 MCG/DL (ref 298–536)
TRANSFERRIN SERPL-MCNC: 306 MG/DL (ref 200–360)
WBC NRBC COR # BLD AUTO: 12.28 10*3/MM3 (ref 3.4–10.8)

## 2024-10-16 PROCEDURE — 82728 ASSAY OF FERRITIN: CPT | Performed by: INTERNAL MEDICINE

## 2024-10-16 PROCEDURE — 36415 COLL VENOUS BLD VENIPUNCTURE: CPT | Performed by: INTERNAL MEDICINE

## 2024-10-16 PROCEDURE — 84466 ASSAY OF TRANSFERRIN: CPT | Performed by: INTERNAL MEDICINE

## 2024-10-16 PROCEDURE — 83615 LACTATE (LD) (LDH) ENZYME: CPT | Performed by: INTERNAL MEDICINE

## 2024-10-16 PROCEDURE — 82533 TOTAL CORTISOL: CPT | Performed by: INTERNAL MEDICINE

## 2024-10-16 PROCEDURE — 85652 RBC SED RATE AUTOMATED: CPT | Performed by: INTERNAL MEDICINE

## 2024-10-16 PROCEDURE — 80053 COMPREHEN METABOLIC PANEL: CPT | Performed by: INTERNAL MEDICINE

## 2024-10-16 PROCEDURE — 85025 COMPLETE CBC W/AUTO DIFF WBC: CPT | Performed by: INTERNAL MEDICINE

## 2024-10-16 PROCEDURE — 83540 ASSAY OF IRON: CPT | Performed by: INTERNAL MEDICINE

## 2024-10-16 RX ORDER — ESCITALOPRAM OXALATE 20 MG/1
1 TABLET ORAL DAILY
COMMUNITY
Start: 2024-09-30

## 2024-10-30 ENCOUNTER — TELEPHONE (OUTPATIENT)
Dept: ONCOLOGY | Facility: CLINIC | Age: 59
End: 2024-10-30
Payer: COMMERCIAL

## 2024-10-30 NOTE — TELEPHONE ENCOUNTER
Outgoing My Chart Message to patient regarding [facility]

## 2025-01-15 ENCOUNTER — LAB (OUTPATIENT)
Dept: LAB | Facility: HOSPITAL | Age: 60
End: 2025-01-15
Payer: COMMERCIAL

## 2025-01-15 DIAGNOSIS — D50.9 IRON DEFICIENCY ANEMIA, UNSPECIFIED IRON DEFICIENCY ANEMIA TYPE: Primary | ICD-10-CM

## 2025-01-15 DIAGNOSIS — E27.9 ADRENAL NODULE: ICD-10-CM

## 2025-01-15 DIAGNOSIS — R53.83 OTHER FATIGUE: ICD-10-CM

## 2025-01-15 DIAGNOSIS — C64.2 CANCER OF LEFT KIDNEY: ICD-10-CM

## 2025-01-15 LAB
25(OH)D3 SERPL-MCNC: 13.3 NG/ML (ref 30–100)
ALBUMIN SERPL-MCNC: 4.4 G/DL (ref 3.5–5.2)
ALBUMIN/GLOB SERPL: 1.5 G/DL
ALP SERPL-CCNC: 125 U/L (ref 39–117)
ALT SERPL W P-5'-P-CCNC: 18 U/L (ref 1–33)
ANION GAP SERPL CALCULATED.3IONS-SCNC: 11.9 MMOL/L (ref 5–15)
AST SERPL-CCNC: 16 U/L (ref 1–32)
BASOPHILS # BLD AUTO: 0.13 10*3/MM3 (ref 0–0.2)
BASOPHILS NFR BLD AUTO: 1 % (ref 0–1.5)
BILIRUB SERPL-MCNC: 0.3 MG/DL (ref 0–1.2)
BUN SERPL-MCNC: 17 MG/DL (ref 6–20)
BUN/CREAT SERPL: 19.5 (ref 7–25)
CALCIUM SPEC-SCNC: 9.7 MG/DL (ref 8.6–10.5)
CHLORIDE SERPL-SCNC: 101 MMOL/L (ref 98–107)
CO2 SERPL-SCNC: 26.1 MMOL/L (ref 22–29)
CREAT SERPL-MCNC: 0.87 MG/DL (ref 0.57–1)
DEPRECATED RDW RBC AUTO: 47.7 FL (ref 37–54)
EGFRCR SERPLBLD CKD-EPI 2021: 77.3 ML/MIN/1.73
EOSINOPHIL # BLD AUTO: 0.3 10*3/MM3 (ref 0–0.4)
EOSINOPHIL NFR BLD AUTO: 2.4 % (ref 0.3–6.2)
ERYTHROCYTE [DISTWIDTH] IN BLOOD BY AUTOMATED COUNT: 15.8 % (ref 12.3–15.4)
FERRITIN SERPL-MCNC: 37.3 NG/ML (ref 13–150)
GLOBULIN UR ELPH-MCNC: 2.9 GM/DL
GLUCOSE SERPL-MCNC: 95 MG/DL (ref 65–99)
HCT VFR BLD AUTO: 43.7 % (ref 34–46.6)
HGB BLD-MCNC: 13.9 G/DL (ref 12–15.9)
IMM GRANULOCYTES # BLD AUTO: 0.07 10*3/MM3 (ref 0–0.05)
IMM GRANULOCYTES NFR BLD AUTO: 0.5 % (ref 0–0.5)
IRON 24H UR-MRATE: 44 MCG/DL (ref 37–145)
IRON SATN MFR SERPL: 10 % (ref 20–50)
LYMPHOCYTES # BLD AUTO: 5.26 10*3/MM3 (ref 0.7–3.1)
LYMPHOCYTES NFR BLD AUTO: 41.3 % (ref 19.6–45.3)
MCH RBC QN AUTO: 26.5 PG (ref 26.6–33)
MCHC RBC AUTO-ENTMCNC: 31.8 G/DL (ref 31.5–35.7)
MCV RBC AUTO: 83.4 FL (ref 79–97)
MONOCYTES # BLD AUTO: 0.79 10*3/MM3 (ref 0.1–0.9)
MONOCYTES NFR BLD AUTO: 6.2 % (ref 5–12)
NEUTROPHILS NFR BLD AUTO: 48.6 % (ref 42.7–76)
NEUTROPHILS NFR BLD AUTO: 6.19 10*3/MM3 (ref 1.7–7)
NRBC BLD AUTO-RTO: 0 /100 WBC (ref 0–0.2)
PLATELET # BLD AUTO: 378 10*3/MM3 (ref 140–450)
PMV BLD AUTO: 10.1 FL (ref 6–12)
POTASSIUM SERPL-SCNC: 4.2 MMOL/L (ref 3.5–5.2)
PROT SERPL-MCNC: 7.3 G/DL (ref 6–8.5)
RBC # BLD AUTO: 5.24 10*6/MM3 (ref 3.77–5.28)
SODIUM SERPL-SCNC: 139 MMOL/L (ref 136–145)
TIBC SERPL-MCNC: 460 MCG/DL (ref 298–536)
TRANSFERRIN SERPL-MCNC: 309 MG/DL (ref 200–360)
WBC NRBC COR # BLD AUTO: 12.74 10*3/MM3 (ref 3.4–10.8)

## 2025-01-15 PROCEDURE — 80053 COMPREHEN METABOLIC PANEL: CPT | Performed by: INTERNAL MEDICINE

## 2025-01-15 PROCEDURE — 83540 ASSAY OF IRON: CPT | Performed by: INTERNAL MEDICINE

## 2025-01-15 PROCEDURE — 84100 ASSAY OF PHOSPHORUS: CPT | Performed by: INTERNAL MEDICINE

## 2025-01-15 PROCEDURE — 82306 VITAMIN D 25 HYDROXY: CPT | Performed by: INTERNAL MEDICINE

## 2025-01-15 PROCEDURE — 82728 ASSAY OF FERRITIN: CPT | Performed by: INTERNAL MEDICINE

## 2025-01-15 PROCEDURE — 85025 COMPLETE CBC W/AUTO DIFF WBC: CPT | Performed by: INTERNAL MEDICINE

## 2025-01-15 PROCEDURE — 84466 ASSAY OF TRANSFERRIN: CPT | Performed by: INTERNAL MEDICINE

## 2025-01-16 ENCOUNTER — TELEPHONE (OUTPATIENT)
Dept: ONCOLOGY | Facility: CLINIC | Age: 60
End: 2025-01-16
Payer: COMMERCIAL

## 2025-01-16 DIAGNOSIS — D50.9 IRON DEFICIENCY ANEMIA, UNSPECIFIED IRON DEFICIENCY ANEMIA TYPE: Primary | ICD-10-CM

## 2025-01-16 PROBLEM — K90.9 MALABSORPTION OF IRON: Status: ACTIVE | Noted: 2025-01-16

## 2025-01-16 LAB — PHOSPHATE SERPL-MCNC: 4.7 MG/DL (ref 2.5–4.5)

## 2025-01-16 RX ORDER — FAMOTIDINE 10 MG/ML
20 INJECTION, SOLUTION INTRAVENOUS AS NEEDED
OUTPATIENT
Start: 2025-01-23

## 2025-01-16 RX ORDER — DIPHENHYDRAMINE HYDROCHLORIDE 50 MG/ML
50 INJECTION INTRAMUSCULAR; INTRAVENOUS AS NEEDED
OUTPATIENT
Start: 2025-01-30

## 2025-01-16 RX ORDER — HYDROCORTISONE SODIUM SUCCINATE 100 MG/2ML
100 INJECTION INTRAMUSCULAR; INTRAVENOUS AS NEEDED
OUTPATIENT
Start: 2025-01-23

## 2025-01-16 RX ORDER — ACETAMINOPHEN 325 MG/1
650 TABLET ORAL ONCE
OUTPATIENT
Start: 2025-01-23

## 2025-01-16 RX ORDER — FAMOTIDINE 20 MG/1
20 TABLET, FILM COATED ORAL ONCE
OUTPATIENT
Start: 2025-01-30

## 2025-01-16 RX ORDER — HYDROCORTISONE SODIUM SUCCINATE 100 MG/2ML
100 INJECTION INTRAMUSCULAR; INTRAVENOUS AS NEEDED
OUTPATIENT
Start: 2025-01-30

## 2025-01-16 RX ORDER — SODIUM CHLORIDE 9 MG/ML
20 INJECTION, SOLUTION INTRAVENOUS ONCE
OUTPATIENT
Start: 2025-01-23

## 2025-01-16 RX ORDER — CETIRIZINE HYDROCHLORIDE 10 MG/1
10 TABLET ORAL ONCE
OUTPATIENT
Start: 2025-01-23

## 2025-01-16 RX ORDER — FAMOTIDINE 20 MG/1
20 TABLET, FILM COATED ORAL ONCE
OUTPATIENT
Start: 2025-01-23

## 2025-01-16 RX ORDER — SODIUM CHLORIDE 9 MG/ML
20 INJECTION, SOLUTION INTRAVENOUS ONCE
OUTPATIENT
Start: 2025-01-30

## 2025-01-16 RX ORDER — DIPHENHYDRAMINE HYDROCHLORIDE 50 MG/ML
50 INJECTION INTRAMUSCULAR; INTRAVENOUS AS NEEDED
OUTPATIENT
Start: 2025-01-23

## 2025-01-16 RX ORDER — ACETAMINOPHEN 325 MG/1
650 TABLET ORAL ONCE
OUTPATIENT
Start: 2025-01-30

## 2025-01-16 RX ORDER — FAMOTIDINE 10 MG/ML
20 INJECTION, SOLUTION INTRAVENOUS AS NEEDED
OUTPATIENT
Start: 2025-01-30

## 2025-01-16 RX ORDER — CETIRIZINE HYDROCHLORIDE 10 MG/1
10 TABLET ORAL ONCE
OUTPATIENT
Start: 2025-01-30

## 2025-01-16 NOTE — TELEPHONE ENCOUNTER
Patient confirms taking oral iron daily, as prescribed. She reports she is up to date on her colonoscopy, but has not had an EGD - she will discuss this with her PCP. She has also been taking OTC equivalent to her Rx vitamin d3 due to cost. Will fax results to her PCP to address.

## 2025-01-23 ENCOUNTER — INFUSION (OUTPATIENT)
Dept: ONCOLOGY | Facility: HOSPITAL | Age: 60
End: 2025-01-23
Payer: COMMERCIAL

## 2025-01-23 VITALS
HEART RATE: 72 BPM | OXYGEN SATURATION: 96 % | SYSTOLIC BLOOD PRESSURE: 142 MMHG | TEMPERATURE: 98.2 F | DIASTOLIC BLOOD PRESSURE: 76 MMHG | RESPIRATION RATE: 18 BRPM

## 2025-01-23 DIAGNOSIS — D50.9 IRON DEFICIENCY ANEMIA, UNSPECIFIED IRON DEFICIENCY ANEMIA TYPE: ICD-10-CM

## 2025-01-23 DIAGNOSIS — K90.9 MALABSORPTION OF IRON: Primary | ICD-10-CM

## 2025-01-23 PROCEDURE — 96374 THER/PROPH/DIAG INJ IV PUSH: CPT

## 2025-01-23 PROCEDURE — 25010000002 FERUMOXYTOL 510 MG/17ML SOLUTION: Performed by: INTERNAL MEDICINE

## 2025-01-23 RX ORDER — LEVOTHYROXINE SODIUM 200 UG/1
200 TABLET ORAL
COMMUNITY

## 2025-01-23 RX ORDER — FAMOTIDINE 20 MG/1
20 TABLET, FILM COATED ORAL ONCE
Status: COMPLETED | OUTPATIENT
Start: 2025-01-23 | End: 2025-01-23

## 2025-01-23 RX ORDER — CETIRIZINE HYDROCHLORIDE 10 MG/1
10 TABLET ORAL ONCE
Status: COMPLETED | OUTPATIENT
Start: 2025-01-23 | End: 2025-01-23

## 2025-01-23 RX ORDER — ACETAMINOPHEN 325 MG/1
650 TABLET ORAL ONCE
Status: COMPLETED | OUTPATIENT
Start: 2025-01-23 | End: 2025-01-23

## 2025-01-23 RX ADMIN — CETIRIZINE HYDROCHLORIDE 10 MG: 10 TABLET, FILM COATED ORAL at 13:30

## 2025-01-23 RX ADMIN — FAMOTIDINE 20 MG: 20 TABLET, FILM COATED ORAL at 13:30

## 2025-01-23 RX ADMIN — ACETAMINOPHEN 650 MG: 325 TABLET ORAL at 13:30

## 2025-01-23 RX ADMIN — FERUMOXYTOL 510 MG: 510 INJECTION INTRAVENOUS at 14:05

## 2025-01-30 ENCOUNTER — INFUSION (OUTPATIENT)
Dept: ONCOLOGY | Facility: HOSPITAL | Age: 60
End: 2025-01-30
Payer: COMMERCIAL

## 2025-01-30 VITALS
OXYGEN SATURATION: 94 % | SYSTOLIC BLOOD PRESSURE: 124 MMHG | HEART RATE: 65 BPM | TEMPERATURE: 98.6 F | DIASTOLIC BLOOD PRESSURE: 71 MMHG | RESPIRATION RATE: 18 BRPM

## 2025-01-30 DIAGNOSIS — D50.9 IRON DEFICIENCY ANEMIA, UNSPECIFIED IRON DEFICIENCY ANEMIA TYPE: ICD-10-CM

## 2025-01-30 DIAGNOSIS — K90.9 MALABSORPTION OF IRON: Primary | ICD-10-CM

## 2025-01-30 PROCEDURE — 96374 THER/PROPH/DIAG INJ IV PUSH: CPT

## 2025-01-30 PROCEDURE — 96365 THER/PROPH/DIAG IV INF INIT: CPT

## 2025-01-30 PROCEDURE — 25010000002 FERUMOXYTOL 510 MG/17ML SOLUTION: Performed by: INTERNAL MEDICINE

## 2025-01-30 RX ORDER — ACETAMINOPHEN 325 MG/1
650 TABLET ORAL ONCE
Status: COMPLETED | OUTPATIENT
Start: 2025-01-30 | End: 2025-01-30

## 2025-01-30 RX ORDER — CETIRIZINE HYDROCHLORIDE 10 MG/1
10 TABLET ORAL ONCE
Status: COMPLETED | OUTPATIENT
Start: 2025-01-30 | End: 2025-01-30

## 2025-01-30 RX ORDER — FAMOTIDINE 20 MG/1
20 TABLET, FILM COATED ORAL ONCE
Status: COMPLETED | OUTPATIENT
Start: 2025-01-30 | End: 2025-01-30

## 2025-01-30 RX ADMIN — FAMOTIDINE 20 MG: 20 TABLET, FILM COATED ORAL at 13:51

## 2025-01-30 RX ADMIN — ACETAMINOPHEN 650 MG: 325 TABLET ORAL at 13:51

## 2025-01-30 RX ADMIN — SODIUM CHLORIDE 510 MG: 9 INJECTION, SOLUTION INTRAVENOUS at 14:14

## 2025-01-30 RX ADMIN — CETIRIZINE HYDROCHLORIDE 10 MG: 10 TABLET, FILM COATED ORAL at 13:51

## 2025-04-02 NOTE — PROGRESS NOTES
Subjective     REASON FOR CONSULTATION:  lymphocytosis  Provide an opinion on any further workup or treatment                             REQUESTING PHYSICIAN:  Stephon    RECORDS OBTAINED:  Records of the patients history including those obtained from the referring provider were reviewed and summarized in detail.    HISTORY OF PRESENT ILLNESS:  The patient is a 60 y.o. year old female who is here for an opinion about the above issue.    History of Present Illness   This is a very pleasant 60-year-old woman who is referred from her primary care provider for evaluation of lymphocytosis.  Reviewing previous lab 10/4/2023 WBC 12.5, hemoglobin 14.3, platelets 373 and white cell differential normal except for increased lymphocytes 4.76.  A peripheral blood flow cytometry was negative for abnormal B or T-cell populations.    The patient is a former smoker and quit in early 2024.  I ordered a CT of the chest abdomen pelvis 7/19/2024 which showed no intrathoracic abnormalities, a left renal mass 2.7 cm concerning for RCC, bilateral adrenal adenomas.  An MRI of the abdomen was performed subsequent again showing a 2.8 x 2.7 x 2.4 cm enhancing left renal mass consistent with RCC.  There was no extension beyond the perirenal fascia or collecting system involvement.  There were bilateral adrenal gland lesions up to 3.8 cm on the right.  The patient was referred to urology and underwent on 9/11/2024 a partial laparoscopic left nephrectomy.  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    The patient was noted to have iron deficiency and poorly responsive to oral iron.  She received 2 doses of Injectafer completed on 1/30/2025.  She complains of bilateral hip pain.    Past Medical History:   Diagnosis Date    Adrenal adenoma     bilateral, having adrenal studies done     Anemia     Anxiety     Arthritis     COPD (chronic  obstructive pulmonary disease)     GERD (gastroesophageal reflux disease)     Goiter     27 years ago    Hyperlipidemia     Hypothyroidism (acquired)     Radiation at age 27 years    PUD (peptic ulcer disease)     Seasonal allergies     Spinal stenosis     Syncope         Past Surgical History:   Procedure Laterality Date    ABDOMINAL SURGERY      Benign tumor on your left side    APPENDECTOMY      CARDIAC CATHETERIZATION N/A 10/06/2023    Procedure: Left Heart Cath;  Surgeon: Nancy Sylvester MD;  Location:  [facility];  Service: Cardiovascular;  Laterality: N/A;    CARDIAC CATHETERIZATION N/A 10/06/2023    Procedure: Coronary angiography;  Surgeon: Nancy Sylvester MD;  Location:  [facility];  Service: Cardiovascular;  Laterality: N/A;    CARDIAC CATHETERIZATION N/A 10/06/2023    Procedure: Left ventriculography;  Surgeon: Nancy Sylvester MD;  Location:  [facility];  Service: Cardiovascular;  Laterality: N/A;    CARPAL TUNNEL RELEASE Right 09/11/2018    Procedure: CARPAL TUNNEL RELEASE;  Surgeon: Chico Maddox MD;  Location: [facility] OR;  Service: Orthopedics    COLONOSCOPY N/A 06/19/2018    Procedure: COLONOSCOPY and polypectomy;  Surgeon: Anika Stephens DO;  Location: [facility] OR;  Service: Gastroenterology    COLONOSCOPY W/ POLYPECTOMY      ENDOSCOPY N/A 06/19/2018    Procedure: ESOPHAGOGASTRODUODENOSCOPY with ELLA test ;  Surgeon: Anika Stephens DO;  Location: [facility] OR;  Service: Gastroenterology    HYSTERECTOMY      LAPAROSCOPIC CHOLECYSTECTOMY      LAPAROSCOPIC CHOLECYSTECTOMY      NEPHRECTOMY PARTIAL Left 9/11/2024    Procedure: LEFT LAPAROSCOPIC PARTIAL NEPHRECTOMY;  Surgeon: Juan Francisco Carver MD;  Location: [facility] OR;  Service: Urology;  Laterality: Left;    OTHER SURGICAL HISTORY      nodule removed from larynx    OTHER SURGICAL HISTORY      radiated thyroid    TUBAL ABDOMINAL LIGATION          Current Outpatient  Medications on File Prior to Visit   Medication Sig Dispense Refill    cholecalciferol 25 MCG tablet Take 1 tablet by mouth Daily.      escitalopram (LEXAPRO) 20 MG tablet Take 1 tablet by mouth Daily.      ferrous sulfate 325 (65 FE) MG tablet Take 1 tablet by mouth Daily With Breakfast. 30 tablet 3    levothyroxine (SYNTHROID, LEVOTHROID) 200 MCG tablet Take 1 tablet by mouth Every Morning.      pravastatin (PRAVACHOL) 10 MG tablet Take 1 tablet by mouth Daily.      atorvastatin (LIPITOR) 80 MG tablet Take 1 tablet by mouth Every Night. (Patient not taking: Reported on 2025)       No current facility-administered medications on file prior to visit.        ALLERGIES:    Allergies   Allergen Reactions    Venomil Honey Bee Venom [Honey Bee Venom] Anaphylaxis    Penicillins Unknown (See Comments)     Anaphylaxis          Social History     Socioeconomic History    Marital status:     Number of children: 3   Tobacco Use    Smoking status: Former     Current packs/day: 0.00     Average packs/day: 1 pack/day for 20.0 years (20.0 ttl pk-yrs)     Types: Cigarettes     Start date:      Quit date:      Years since quittin.2    Smokeless tobacco: Never    Tobacco comments:     quit 5 months ago   Vaping Use    Vaping status: Never Used   Substance and Sexual Activity    Alcohol use: Not Currently     Comment: social, once/year    Drug use: No    Sexual activity: Defer     Partners: Male     Birth control/protection: Hysterectomy        Family History   Problem Relation Age of Onset    COPD Mother     Breast cancer Mother     Anemia Mother     Hypertension Father     Colon cancer Father     Heart disease Father     Hypertension Sister     Cancer Sister     Colon polyps Sister     Malig Hyperthermia Neg Hx         Review of Systems   Constitutional:  Positive for fatigue. Negative for fever and unexpected weight change.   HENT: Negative.     Respiratory:  Negative for cough, chest tightness, shortness of  "breath and wheezing.    Cardiovascular: Negative.    Gastrointestinal:  Negative for constipation and diarrhea.   Genitourinary:  Negative for hematuria.   Musculoskeletal:  Positive for arthralgias.   Skin: Negative.    Hematological: Negative.    Psychiatric/Behavioral: Negative.            Objective     Vitals:    04/08/25 1419   BP: 137/81   Pulse: 75   Resp: 16   Temp: 98.3 °F (36.8 °C)   TempSrc: Infrared   SpO2: 96%   Weight: 91.9 kg (202 lb 9.6 oz)   Height: 167.6 cm (65.98\")   PainSc: 0-No pain             4/8/2025     2:44 PM   Current Status   ECOG score 0       Physical Exam    CONSTITUTIONAL: pleasant well-developed adult woman  HEENT: no icterus, no thrush, moist membranes  LYMPH: no cervical or supraclavicular lad  CV: RRR, S1S2, no murmur  RESP: cta bilat, coarse bs  GI: soft, non-tender, no splenomegaly, +bs  MUSC: no edema, normal gait  NEURO: alert and oriented x3, normal strength  PSYCH: normal mood and affect  Unchanged-4/8/25    RECENT LABS:  Hematology WBC   Date Value Ref Range Status   04/08/2025 11.93 (H) 3.40 - 10.80 10*3/mm3 Final     RBC   Date Value Ref Range Status   04/08/2025 5.09 3.77 - 5.28 10*6/mm3 Final     Hemoglobin   Date Value Ref Range Status   04/08/2025 14.5 12.0 - 15.9 g/dL Final     Hematocrit   Date Value Ref Range Status   04/08/2025 44.6 34.0 - 46.6 % Final     Platelets   Date Value Ref Range Status   04/08/2025 359 140 - 450 10*3/mm3 Final        Lab Results   Component Value Date    GLUCOSE 113 (H) 04/08/2025    BUN 20 04/08/2025    CREATININE 0.90 04/08/2025    EGFR 73.8 04/08/2025    BCR 22.2 04/08/2025    K 4.0 04/08/2025    CO2 26.0 04/08/2025    CALCIUM 9.7 04/08/2025    ALBUMIN 4.1 04/08/2025    BILITOT 0.3 04/08/2025    AST 16 04/08/2025    ALT 20 04/08/2025     CT Chest With Contrast Diagnostic (04/03/2025 10:56 AM)  CT Abdomen Pelvis With & Without Contrast (04/03/2025 10:56 AM)  Impression:  1.There is some atelectasis in the lungs.  2.Nonspecific " precarinal lymph node. This appears smaller.  3.Coronary artery calcification..  4.Bilateral adrenal lesions which have been noted and may relate to adenomas. Correlation with adrenal labs could be obtained to reassess  5.Interval partial nephrectomy left kidney. There are tiny cysts within the kidneys.  6.Atherosclerotic vascular calcification  7.Degenerative changes are noted involving the sacroiliac joints.  8.Bulging of the annulus of the disc in the lower lumbar spine. There is facet arthropathy as well.    Assessment & Plan     *Persistent lymphocytosis  The patient has had mild increased lymphocyte count since October 2023 3.7-4.76 probable reactive but need to rule out a lymphoproliferative population  7/10/2024 peripheral blood flow cytometry was negative for abnormal B or T-cell populations.  *Cough/shortness of air/chest tightness, history of lung nodule/microscopic hematuria/pelvic pain  CT of the chest abdomen pelvis 7/19/2024 which showed no intrathoracic abnormalities, a left renal mass 2.7 cm concerning for RCC, bilateral adrenal adenomas.    MRI of the abdomen was performed subsequent again showing a 2.8 x 2.7 x 2.4 cm enhancing left renal mass consistent with RCC.  There was no extension beyond the perirenal fascia or collecting system involvement.  There were bilateral adrenal gland lesions up to 3.8 cm on the right.  9/11/2024 a partial laparoscopic left nephrectomy.  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  UISS prognostic score intermediate-no indication for adjuvant immunotherapy/TKI therapy for stage of disease  *History of tobacco abuse-previously quit but now smoking again  *Iron deficiency poorly responsive to oral iron  Status post 2 doses IV Venofer completed 1/30/2025  *Bilateral adrenal adenomas  *Bilateral hip pain-CT show sacroiliac degenerative  changes    Oncology/hematology plan/recommendations:  Recheck ferritin iron profile today  Endocrinology referral for evaluation of adrenal nodule-scheduled in June  Follow-up 6 months after surveillance scans ordered by urology with CBC CMP ferritin iron profile B12 level  Orthopedic referral for bilateral hip pain  Advised tobacco cessation

## 2025-04-03 ENCOUNTER — HOSPITAL ENCOUNTER (OUTPATIENT)
Dept: CT IMAGING | Facility: HOSPITAL | Age: 60
Discharge: HOME | End: 2025-04-03
Admitting: UROLOGY
Payer: COMMERCIAL

## 2025-04-03 DIAGNOSIS — N28.89 RENAL MASS: ICD-10-CM

## 2025-04-03 LAB — CREAT BLDA-MCNC: 0.9 MG/DL (ref 0.6–1.3)

## 2025-04-03 PROCEDURE — 71260 CT THORAX DX C+: CPT

## 2025-04-03 PROCEDURE — 25510000001 IOPAMIDOL PER 1 ML: Performed by: UROLOGY

## 2025-04-03 PROCEDURE — 82565 ASSAY OF CREATININE: CPT

## 2025-04-03 PROCEDURE — 74178 CT ABD&PLV WO CNTR FLWD CNTR: CPT

## 2025-04-03 RX ORDER — IOPAMIDOL 755 MG/ML
100 INJECTION, SOLUTION INTRAVASCULAR
Status: COMPLETED | OUTPATIENT
Start: 2025-04-03 | End: 2025-04-03

## 2025-04-03 RX ADMIN — IOPAMIDOL 100 ML: 755 INJECTION, SOLUTION INTRAVENOUS at 10:57

## 2025-04-08 ENCOUNTER — RESULTS FOLLOW-UP (OUTPATIENT)
Dept: LAB | Facility: HOSPITAL | Age: 60
End: 2025-04-08
Payer: COMMERCIAL

## 2025-04-08 ENCOUNTER — OFFICE VISIT (OUTPATIENT)
Dept: ONCOLOGY | Facility: CLINIC | Age: 60
End: 2025-04-08
Payer: COMMERCIAL

## 2025-04-08 ENCOUNTER — LAB (OUTPATIENT)
Dept: LAB | Facility: HOSPITAL | Age: 60
End: 2025-04-08
Payer: COMMERCIAL

## 2025-04-08 VITALS
WEIGHT: 202.6 LBS | HEART RATE: 75 BPM | SYSTOLIC BLOOD PRESSURE: 137 MMHG | TEMPERATURE: 98.3 F | RESPIRATION RATE: 16 BRPM | HEIGHT: 66 IN | OXYGEN SATURATION: 96 % | BODY MASS INDEX: 32.56 KG/M2 | DIASTOLIC BLOOD PRESSURE: 81 MMHG

## 2025-04-08 DIAGNOSIS — M25.552 BILATERAL HIP PAIN: ICD-10-CM

## 2025-04-08 DIAGNOSIS — M25.551 BILATERAL HIP PAIN: ICD-10-CM

## 2025-04-08 DIAGNOSIS — R53.83 OTHER FATIGUE: ICD-10-CM

## 2025-04-08 DIAGNOSIS — C64.2 CANCER OF LEFT KIDNEY: ICD-10-CM

## 2025-04-08 DIAGNOSIS — D72.820 LYMPHOCYTOSIS: ICD-10-CM

## 2025-04-08 DIAGNOSIS — E27.9 ADRENAL NODULE: ICD-10-CM

## 2025-04-08 DIAGNOSIS — M46.1 SACROILIITIS: ICD-10-CM

## 2025-04-08 DIAGNOSIS — D72.820 LYMPHOCYTOSIS: Primary | ICD-10-CM

## 2025-04-08 LAB
ALBUMIN SERPL-MCNC: 4.1 G/DL (ref 3.5–5.2)
ALBUMIN/GLOB SERPL: 1.5 G/DL
ALP SERPL-CCNC: 122 U/L (ref 39–117)
ALT SERPL W P-5'-P-CCNC: 20 U/L (ref 1–33)
ANION GAP SERPL CALCULATED.3IONS-SCNC: 13 MMOL/L (ref 5–15)
AST SERPL-CCNC: 16 U/L (ref 1–32)
BASOPHILS # BLD AUTO: 0.13 10*3/MM3 (ref 0–0.2)
BASOPHILS NFR BLD AUTO: 1.1 % (ref 0–1.5)
BILIRUB SERPL-MCNC: 0.3 MG/DL (ref 0–1.2)
BUN SERPL-MCNC: 20 MG/DL (ref 6–20)
BUN/CREAT SERPL: 22.2 (ref 7–25)
CALCIUM SPEC-SCNC: 9.7 MG/DL (ref 8.6–10.5)
CHLORIDE SERPL-SCNC: 104 MMOL/L (ref 98–107)
CO2 SERPL-SCNC: 26 MMOL/L (ref 22–29)
CREAT SERPL-MCNC: 0.9 MG/DL (ref 0.57–1)
DEPRECATED RDW RBC AUTO: 52.8 FL (ref 37–54)
EGFRCR SERPLBLD CKD-EPI 2021: 73.8 ML/MIN/1.73
EOSINOPHIL # BLD AUTO: 0.27 10*3/MM3 (ref 0–0.4)
EOSINOPHIL NFR BLD AUTO: 2.3 % (ref 0.3–6.2)
ERYTHROCYTE [DISTWIDTH] IN BLOOD BY AUTOMATED COUNT: 16.4 % (ref 12.3–15.4)
FERRITIN SERPL-MCNC: 207 NG/ML (ref 13–150)
GLOBULIN UR ELPH-MCNC: 2.8 GM/DL
GLUCOSE SERPL-MCNC: 113 MG/DL (ref 65–99)
HCT VFR BLD AUTO: 44.6 % (ref 34–46.6)
HGB BLD-MCNC: 14.5 G/DL (ref 12–15.9)
IMM GRANULOCYTES # BLD AUTO: 0.11 10*3/MM3 (ref 0–0.05)
IMM GRANULOCYTES NFR BLD AUTO: 0.9 % (ref 0–0.5)
IRON 24H UR-MRATE: 71 MCG/DL (ref 37–145)
IRON SATN MFR SERPL: 19 % (ref 20–50)
LYMPHOCYTES # BLD AUTO: 4.47 10*3/MM3 (ref 0.7–3.1)
LYMPHOCYTES NFR BLD AUTO: 37.5 % (ref 19.6–45.3)
MCH RBC QN AUTO: 28.5 PG (ref 26.6–33)
MCHC RBC AUTO-ENTMCNC: 32.5 G/DL (ref 31.5–35.7)
MCV RBC AUTO: 87.6 FL (ref 79–97)
MONOCYTES # BLD AUTO: 0.7 10*3/MM3 (ref 0.1–0.9)
MONOCYTES NFR BLD AUTO: 5.9 % (ref 5–12)
NEUTROPHILS NFR BLD AUTO: 52.3 % (ref 42.7–76)
NEUTROPHILS NFR BLD AUTO: 6.25 10*3/MM3 (ref 1.7–7)
NRBC BLD AUTO-RTO: 0 /100 WBC (ref 0–0.2)
PLATELET # BLD AUTO: 359 10*3/MM3 (ref 140–450)
PMV BLD AUTO: 10.6 FL (ref 6–12)
POTASSIUM SERPL-SCNC: 4 MMOL/L (ref 3.5–5.2)
PROT SERPL-MCNC: 6.9 G/DL (ref 6–8.5)
RBC # BLD AUTO: 5.09 10*6/MM3 (ref 3.77–5.28)
SODIUM SERPL-SCNC: 143 MMOL/L (ref 136–145)
TIBC SERPL-MCNC: 380 MCG/DL (ref 298–536)
TRANSFERRIN SERPL-MCNC: 255 MG/DL (ref 200–360)
WBC NRBC COR # BLD AUTO: 11.93 10*3/MM3 (ref 3.4–10.8)

## 2025-04-08 PROCEDURE — 85025 COMPLETE CBC W/AUTO DIFF WBC: CPT | Performed by: INTERNAL MEDICINE

## 2025-04-08 PROCEDURE — 82728 ASSAY OF FERRITIN: CPT | Performed by: INTERNAL MEDICINE

## 2025-04-08 PROCEDURE — 36415 COLL VENOUS BLD VENIPUNCTURE: CPT

## 2025-04-08 PROCEDURE — 84466 ASSAY OF TRANSFERRIN: CPT | Performed by: INTERNAL MEDICINE

## 2025-04-08 PROCEDURE — 80053 COMPREHEN METABOLIC PANEL: CPT | Performed by: INTERNAL MEDICINE

## 2025-04-08 PROCEDURE — 99214 OFFICE O/P EST MOD 30 MIN: CPT | Performed by: INTERNAL MEDICINE

## 2025-04-08 PROCEDURE — 83540 ASSAY OF IRON: CPT | Performed by: INTERNAL MEDICINE

## 2025-04-08 RX ORDER — PRAVASTATIN SODIUM 10 MG
10 TABLET ORAL DAILY
COMMUNITY

## 2025-04-10 ENCOUNTER — TRANSCRIBE ORDERS (OUTPATIENT)
Dept: ADMINISTRATIVE | Facility: HOSPITAL | Age: 60
End: 2025-04-10
Payer: COMMERCIAL

## 2025-04-10 DIAGNOSIS — C64.2 CANCER OF KIDNEY, LEFT: Primary | ICD-10-CM

## 2025-04-16 ENCOUNTER — TELEPHONE (OUTPATIENT)
Dept: ONCOLOGY | Facility: CLINIC | Age: 60
End: 2025-04-16
Payer: COMMERCIAL

## 2025-04-16 DIAGNOSIS — M46.1 SACROILIITIS: Primary | ICD-10-CM

## 2025-04-16 NOTE — TELEPHONE ENCOUNTER
"----- Message from Mike Marley sent at 4/15/2025 12:27 PM EDT -----  Regarding: RE: Orthopedic referral for bilateral hip pain  ok  ----- Message -----  From: Celestino Wong RN  Sent: 4/15/2025  11:41 AM EDT  To: Mike Marley MD  Subject: Orthopedic referral for bilateral hip pain       \"Dr Velazquez does not treat the hips. Spoke with Dr Maddox and he feels that based on CT report patient needs to see a back/spine md.\"Do you want me to refer to neurosurgery instead or?  "

## 2025-05-22 RX ORDER — FERROUS SULFATE 325(65) MG
1 TABLET ORAL
Qty: 30 TABLET | Refills: 3 | Status: SHIPPED | OUTPATIENT
Start: 2025-05-22

## 2025-06-03 ENCOUNTER — OFFICE VISIT (OUTPATIENT)
Dept: ENDOCRINOLOGY | Age: 60
End: 2025-06-03
Payer: COMMERCIAL

## 2025-06-03 ENCOUNTER — LAB (OUTPATIENT)
Facility: HOSPITAL | Age: 60
End: 2025-06-03
Payer: COMMERCIAL

## 2025-06-03 VITALS
TEMPERATURE: 97.6 F | OXYGEN SATURATION: 99 % | HEART RATE: 72 BPM | DIASTOLIC BLOOD PRESSURE: 90 MMHG | WEIGHT: 204.8 LBS | BODY MASS INDEX: 33.07 KG/M2 | SYSTOLIC BLOOD PRESSURE: 130 MMHG

## 2025-06-03 DIAGNOSIS — E89.0 POSTABLATIVE HYPOTHYROIDISM: ICD-10-CM

## 2025-06-03 DIAGNOSIS — D35.01 BILATERAL ADRENAL ADENOMAS: Primary | ICD-10-CM

## 2025-06-03 DIAGNOSIS — E89.0 POSTABLATIVE HYPOTHYROIDISM: Primary | ICD-10-CM

## 2025-06-03 DIAGNOSIS — D35.02 BILATERAL ADRENAL ADENOMAS: Primary | ICD-10-CM

## 2025-06-03 LAB
ALBUMIN SERPL-MCNC: 4.6 G/DL (ref 3.5–5.2)
ALBUMIN/GLOB SERPL: 1.6 G/DL
ALP SERPL-CCNC: 109 U/L (ref 39–117)
ALT SERPL W P-5'-P-CCNC: 20 U/L (ref 1–33)
ANION GAP SERPL CALCULATED.3IONS-SCNC: 9.7 MMOL/L (ref 5–15)
AST SERPL-CCNC: 11 U/L (ref 1–32)
BILIRUB SERPL-MCNC: 0.4 MG/DL (ref 0–1.2)
BUN SERPL-MCNC: 17 MG/DL (ref 6–20)
BUN/CREAT SERPL: 18.5 (ref 7–25)
CALCIUM SPEC-SCNC: 10.2 MG/DL (ref 8.6–10.5)
CHLORIDE SERPL-SCNC: 103 MMOL/L (ref 98–107)
CO2 SERPL-SCNC: 28.3 MMOL/L (ref 22–29)
CREAT SERPL-MCNC: 0.92 MG/DL (ref 0.57–1)
EGFRCR SERPLBLD CKD-EPI 2021: 71.9 ML/MIN/1.73
GLOBULIN UR ELPH-MCNC: 2.9 GM/DL
GLUCOSE SERPL-MCNC: 113 MG/DL (ref 65–99)
POTASSIUM SERPL-SCNC: 4.4 MMOL/L (ref 3.5–5.2)
PROT SERPL-MCNC: 7.5 G/DL (ref 6–8.5)
SODIUM SERPL-SCNC: 141 MMOL/L (ref 136–145)
T4 FREE SERPL-MCNC: 1.02 NG/DL (ref 0.92–1.68)
TSH SERPL DL<=0.05 MIU/L-ACNC: 4.06 UIU/ML (ref 0.27–4.2)

## 2025-06-03 PROCEDURE — 84439 ASSAY OF FREE THYROXINE: CPT | Performed by: STUDENT IN AN ORGANIZED HEALTH CARE EDUCATION/TRAINING PROGRAM

## 2025-06-03 PROCEDURE — 36415 COLL VENOUS BLD VENIPUNCTURE: CPT | Performed by: STUDENT IN AN ORGANIZED HEALTH CARE EDUCATION/TRAINING PROGRAM

## 2025-06-03 PROCEDURE — 84443 ASSAY THYROID STIM HORMONE: CPT | Performed by: STUDENT IN AN ORGANIZED HEALTH CARE EDUCATION/TRAINING PROGRAM

## 2025-06-03 PROCEDURE — 80053 COMPREHEN METABOLIC PANEL: CPT | Performed by: STUDENT IN AN ORGANIZED HEALTH CARE EDUCATION/TRAINING PROGRAM

## 2025-06-03 RX ORDER — DEXAMETHASONE 1 MG
1 TABLET ORAL ONCE
Qty: 1 TABLET | Refills: 0 | Status: SHIPPED | OUTPATIENT
Start: 2025-06-03 | End: 2025-06-03

## 2025-06-03 RX ORDER — LEVOTHYROXINE SODIUM 200 UG/1
200 TABLET ORAL DAILY
COMMUNITY
Start: 2024-12-25 | End: 2025-06-03 | Stop reason: SDUPTHER

## 2025-06-03 RX ORDER — LEVOTHYROXINE SODIUM 200 UG/1
200 TABLET ORAL DAILY
COMMUNITY
Start: 2025-06-03 | End: 2025-06-03 | Stop reason: SDUPTHER

## 2025-06-03 RX ORDER — ALBUTEROL SULFATE 90 UG/1
INHALANT RESPIRATORY (INHALATION)
COMMUNITY
Start: 2025-05-22

## 2025-06-03 RX ORDER — LEVOTHYROXINE SODIUM 200 UG/1
200 TABLET ORAL DAILY
Qty: 90 TABLET | Refills: 1 | Status: SHIPPED | OUTPATIENT
Start: 2025-06-03 | End: 2025-06-03 | Stop reason: SDUPTHER

## 2025-06-03 RX ORDER — LEVOTHYROXINE SODIUM 200 UG/1
200 TABLET ORAL DAILY
Qty: 90 TABLET | Refills: 1 | Status: SHIPPED | OUTPATIENT
Start: 2025-06-03 | End: 2025-11-30

## 2025-06-03 NOTE — PROGRESS NOTES
"Chief Complaint/ reason for consult      Adrenal Nodule (Taking Atorvastatin 20 mg Patient feels \"off\" and extremely fatigued.)      History of Present Illness      Referred for further management of bilateral adrenal adenomas    First noted to have bilateral adrenal lesions in   CT abdomen 2025:  Bilateral adrenal lesions which have been noted and may relate to adenomas     MRI abdomen 2024:  Bilateral adrenal gland lesions demonstrate significant signal dropout on out of phase imaging, consistent with benign adrenal adenomas. These measure 3.8 cm on the right and 2.2 cm on the left, previously 3.1 cm on the right and 1.8 cm on the left in   .     MRI abdomen 2014:  Benign bilateral adrenal adenomas measuring up to 3.2 cm on the right and 2.0 cm on the left.      No history of hypertension  No history of chronic steroid intake or last steroid injection, last steroid use a month ago for pneumonia  Denies chest pain  Reports excessive sweating when she is stressed  Her paternal aunt  of adrenal cancer    Follows with oncology for persistent lymphocytosis      Hx of left RCC  s/p left nephrectomy   Hx of toxic nodules s/p ablation   Synthroid 200 mcg daily, denies palpitations        Objective   Vital Signs:  /90   Pulse 72   Temp 97.6 °F (36.4 °C) (Oral)   Wt 92.9 kg (204 lb 12.8 oz)   SpO2 99%   BMI 33.07 kg/m²   Estimated body mass index is 33.07 kg/m² as calculated from the following:    Height as of 25: 167.6 cm (65.98\").    Weight as of this encounter: 92.9 kg (204 lb 12.8 oz).           Physical Exam  Constitutional:       Appearance: She is obese.   Cardiovascular:      Rate and Rhythm: Normal rate.   Pulmonary:      Effort: Pulmonary effort is normal.      Breath sounds: Normal breath sounds. No wheezing.   Abdominal:      Palpations: Abdomen is soft.      Tenderness: There is no abdominal tenderness.   Musculoskeletal:         General: No swelling.      Cervical " "back: Neck supple. No tenderness.   Neurological:      Mental Status: She is alert and oriented to person, place, and time.   Psychiatric:         Mood and Affect: Mood normal.        Result Review :  The following data was reviewed by: Mahrokh Nokhbehzaeim, MD on 06/03/2025:  CMP          1/15/2025    13:58 4/3/2025    10:17 4/8/2025    14:25   CMP   Glucose 95   113    BUN 17   20    Creatinine 0.87  0.90  0.90    EGFR 77.3   73.8    Sodium 139   143    Potassium 4.2   4.0    Chloride 101   104    Calcium 9.7   9.7    Total Protein 7.3   6.9    Albumin 4.4   4.1    Globulin 2.9   2.8    Total Bilirubin 0.3   0.3    Alkaline Phosphatase 125   122    AST (SGOT) 16   16    ALT (SGPT) 18   20    Albumin/Globulin Ratio 1.5   1.5    BUN/Creatinine Ratio 19.5   22.2    Anion Gap 11.9   13.0      CMP          1/15/2025    13:58 4/3/2025    10:17 4/8/2025    14:25   CMP   Glucose 95   113    BUN 17   20    Creatinine 0.87  0.90  0.90    EGFR 77.3   73.8    Sodium 139   143    Potassium 4.2   4.0    Chloride 101   104    Calcium 9.7   9.7    Total Protein 7.3   6.9    Albumin 4.4   4.1    Globulin 2.9   2.8    Total Bilirubin 0.3   0.3    Alkaline Phosphatase 125   122    AST (SGOT) 16   16    ALT (SGPT) 18   20    Albumin/Globulin Ratio 1.5   1.5    BUN/Creatinine Ratio 19.5   22.2    Anion Gap 11.9   13.0          No results found for: \"FREET4\"   No results found for: \"ACTH\"   Cortisol   Date Value Ref Range Status   10/16/2024 13.00   mcg/dL Final      Aldosterone   Date Value Ref Range Status   09/02/2020 4.1 ng/dL Final     Comment:     (note)  INTERPRETIVE INFORMATION: Aldosterone, Serum  Reference intervals for age 15 and older:  Upright .........  4.0 - 31.0 ng/dL  Supine ..........  Less than or equal to 16.0 ng/dL  Unspecified .....  Less than or equal to 31.0 ng/dL  Normal serum levels of aldosterone are dependent on the sodium   intake and whether the patient is upright or supine. High sodium   intake will tend " "to suppress serum aldosterone, whereas low sodium   intake will elevate serum aldosterone. The reference intervals for   serum aldosterone are based on normal sodium intake.   Access complete set of age- and/or gender-specific reference   intervals for this test in the [facility] Test Directory   ([email or web address]).  Performed By: [facility]  [address] [place] [id number]  : Nelly Portillo MD      No results found for: \"NORMETANEP\", \"METANEPHRI\"   No results found for: \"DEXAMETHAS\"   No results found for: \"DHEASO4\", \"DHEA\"   Data reviewed : Radiologic studies CT and MRI abdomen             Assessment and Plan   Diagnoses and all orders for this visit:    1. Bilateral adrenal adenomas (Primary)  Assessment & Plan:  Will do the comprehensive work up including ARR, serum metanephrine and DST   Further testing based on results  Repeat CT in August is scheduled, she will contact the ordering provider to add the Hounsfield units of the adrenal mass      Orders:  -     Aldosterone / Renin Ratio  -     Metanephrines, Frac. Free, Plasma  -     Comprehensive Metabolic Panel  -     DHEA-Sulfate  -     ACTH  -     dexAMETHasone (DECADRON) 1 MG tablet; Take 1 tablet by mouth 1 (One) Time for 1 dose. Take at bedtime the day before getting lab work  Dispense: 1 tablet; Refill: 0    2. Postablative hypothyroidism  Assessment & Plan:  Recheck TFT     Orders:  -     TSH  -     T4, Free             Follow Up   Return in about 4 months (around 10/3/2025).  Patient was given instructions and counseling regarding her condition or for health maintenance advice. Please see specific information pulled into the AVS if appropriate.       "

## 2025-06-03 NOTE — ASSESSMENT & PLAN NOTE
Will do the comprehensive work up including ARR, serum metanephrine and DST   Further testing based on results  Repeat CT in August is scheduled, she will contact the ordering provider to add the Hounsfield units of the adrenal mass

## 2025-06-04 LAB
ACTH PLAS-MCNC: 4.9 PG/ML (ref 7.2–63.3)
DHEA-S SERPL-MCNC: 25.7 UG/DL (ref 29.4–220.5)

## 2025-06-05 ENCOUNTER — TELEPHONE (OUTPATIENT)
Dept: ENDOCRINOLOGY | Age: 60
End: 2025-06-05
Payer: COMMERCIAL

## 2025-06-05 NOTE — TELEPHONE ENCOUNTER
Patient called the office stating that she would like to get her dexamethasone and cortisol order done at [facility] . I asked for their phone number so I can fax the orders for her.     Ph: [phone]    Spoke with Olimpia from their lab and I need to send it to central registration Fx: [phone]

## 2025-06-06 LAB
ALDOST SERPL-MCNC: 3.7 NG/DL (ref 0–30)
ALDOST/RENIN PLAS-RTO: 12 {RATIO} (ref 0–30)
RENIN PLAS-CCNC: 0.31 NG/ML/HR (ref 0.17–5.38)

## 2025-06-08 LAB
METANEPH FREE SERPL-MCNC: <25 PG/ML (ref 0–88)
NORMETANEPHRINE SERPL-MCNC: <25 PG/ML (ref 0–244)

## 2025-06-23 ENCOUNTER — TELEPHONE (OUTPATIENT)
Dept: ENDOCRINOLOGY | Age: 60
End: 2025-06-23
Payer: COMMERCIAL

## 2025-06-24 NOTE — TELEPHONE ENCOUNTER
Patient called wanting to know if we received her lab results from [facility] because she is feeling bad and wanted to know what Dr. MAYO would like to do. I told her we only have the results of her 24 hr creatinine urine collection. She said she will call them to see if they have the results.

## 2025-06-25 ENCOUNTER — TELEPHONE (OUTPATIENT)
Dept: ENDOCRINOLOGY | Age: 60
End: 2025-06-25
Payer: COMMERCIAL

## 2025-06-26 ENCOUNTER — TELEPHONE (OUTPATIENT)
Dept: ENDOCRINOLOGY | Age: 60
End: 2025-06-26
Payer: COMMERCIAL

## 2025-06-26 NOTE — TELEPHONE ENCOUNTER
----- Message from Mahrokh Nokhbehzaeim sent at 6/25/2025  4:48 PM EDT -----  Regarding: RE: Cortisol  Please let her know 24-hour urine cortisol level is normal, did she get a chance to do the late-night salivary cortisol?  Thanks  ----- Message -----  From: Shazia Le MA  Sent: 6/25/2025   3:31 PM EDT  To: Mahrokh Nokhbehzaeim, MD  Subject: Cortisol                                         Received patient's 24 hr cortisol collection results. It is attached in patient's media.

## 2025-07-14 ENCOUNTER — TELEPHONE (OUTPATIENT)
Dept: ENDOCRINOLOGY | Age: 60
End: 2025-07-14
Payer: COMMERCIAL

## 2025-07-15 DIAGNOSIS — D35.01 BILATERAL ADRENAL ADENOMAS: Primary | ICD-10-CM

## 2025-07-15 DIAGNOSIS — D35.02 BILATERAL ADRENAL ADENOMAS: Primary | ICD-10-CM

## 2025-07-15 RX ORDER — DEXAMETHASONE 1 MG
1 TABLET ORAL ONCE
Qty: 1 TABLET | Refills: 0 | Status: SHIPPED | OUTPATIENT
Start: 2025-07-15 | End: 2025-07-15

## 2025-07-17 ENCOUNTER — TELEPHONE (OUTPATIENT)
Dept: ENDOCRINOLOGY | Age: 60
End: 2025-07-17
Payer: COMMERCIAL

## 2025-07-17 NOTE — TELEPHONE ENCOUNTER
----- Message from Shazia RODRIGUEZ sent at 7/17/2025 10:30 AM EDT -----  Received Cortisol results, still waiting on dexamethasone suppression test results. It is attached to media.

## 2025-07-22 ENCOUNTER — TELEPHONE (OUTPATIENT)
Dept: ENDOCRINOLOGY | Age: 60
End: 2025-07-22
Payer: COMMERCIAL

## 2025-07-22 NOTE — TELEPHONE ENCOUNTER
Patient called letting us know that dexamethasone and cortisol results are done. We should have received them and I told her we did get the results.

## 2025-07-24 ENCOUNTER — TELEPHONE (OUTPATIENT)
Dept: ENDOCRINOLOGY | Age: 60
End: 2025-07-24
Payer: COMMERCIAL

## 2025-07-24 NOTE — TELEPHONE ENCOUNTER
Interventional radiology called and said they do not do the adrenal sampling. They recommended [facility] or [facility]. Is there a preference on where you want this sent to?

## 2025-08-06 ENCOUNTER — TELEPHONE (OUTPATIENT)
Dept: ENDOCRINOLOGY | Age: 60
End: 2025-08-06
Payer: COMMERCIAL

## 2025-08-08 ENCOUNTER — TELEPHONE (OUTPATIENT)
Dept: ENDOCRINOLOGY | Age: 60
End: 2025-08-08
Payer: COMMERCIAL

## 2025-08-21 ENCOUNTER — HOSPITAL ENCOUNTER (OUTPATIENT)
Dept: CT IMAGING | Facility: HOSPITAL | Age: 60
Discharge: HOME | End: 2025-08-21
Admitting: UROLOGY
Payer: COMMERCIAL

## 2025-08-21 DIAGNOSIS — C64.2 CANCER OF KIDNEY, LEFT: ICD-10-CM

## 2025-08-21 PROCEDURE — 25510000001 IOPAMIDOL PER 1 ML: Performed by: UROLOGY

## 2025-08-21 PROCEDURE — 71260 CT THORAX DX C+: CPT

## 2025-08-21 PROCEDURE — 74178 CT ABD&PLV WO CNTR FLWD CNTR: CPT

## 2025-08-21 RX ORDER — IOPAMIDOL 755 MG/ML
100 INJECTION, SOLUTION INTRAVASCULAR
Status: COMPLETED | OUTPATIENT
Start: 2025-08-21 | End: 2025-08-21

## 2025-08-21 RX ADMIN — IOPAMIDOL 100 ML: 755 INJECTION, SOLUTION INTRAVENOUS at 09:02

## 2025-08-26 ENCOUNTER — RESULTS FOLLOW-UP (OUTPATIENT)
Dept: ENDOCRINOLOGY | Age: 60
End: 2025-08-26
Payer: COMMERCIAL

## (undated) DEVICE — CATH DIAG IMPULSE FL3.5 5F 100CM

## (undated) DEVICE — 2, DISPOSABLE SUCTION/IRRIGATOR WITH DISPOSABLE TIP: Brand: STRYKEFLOW

## (undated) DEVICE — BNDG ESMARK 4IN 9FT LF STRL BLU

## (undated) DEVICE — THE BITE BLOCK MAXI, LATEX FREE STRAP IS USED TO PROTECT THE ENDOSCOPE INSERTION TUBE FROM BEING BITTEN BY THE PATIENT.

## (undated) DEVICE — BANDAGE,GAUZE,BULKEE II,4.5"X4.1YD,STRL: Brand: MEDLINE

## (undated) DEVICE — DISPOSABLE BIPOLAR CODE, 12' (3.66 M): Brand: CONMED

## (undated) DEVICE — APPL CHLORAPREP W/TINT 26ML ORNG

## (undated) DEVICE — SUT VIC 2/0 CT2 27IN J269H

## (undated) DEVICE — ANTIBACTERIAL UNDYED BRAIDED (POLYGLACTIN 910), SYNTHETIC ABSORBABLE SUTURE: Brand: COATED VICRYL

## (undated) DEVICE — ENDOPOUCH RETRIEVER SPECIMEN RETRIEVAL BAGS: Brand: ENDOPOUCH RETRIEVER

## (undated) DEVICE — GLOVE,SURG,SENSICARE,ALOE,LF,PF,7: Brand: MEDLINE

## (undated) DEVICE — SUT SILK 2/0 FS BLK 18IN 685G

## (undated) DEVICE — BNDG ELAS ELITE V/CLOSE 3IN 5YD LF STRL

## (undated) DEVICE — SYR LL 3CC

## (undated) DEVICE — TP SILK DURAPORE 2 IN

## (undated) DEVICE — INTENDED FOR TISSUE SEPARATION, AND OTHER PROCEDURES THAT REQUIRE A SHARP SURGICAL BLADE TO PUNCTURE OR CUT.: Brand: BARD-PARKER ® STAINLESS STEEL BLADES

## (undated) DEVICE — GLIDESHEATH SLENDER STAINLESS STEEL KIT: Brand: GLIDESHEATH SLENDER

## (undated) DEVICE — MASK,FACE,FLUID RESIST,SHLD,EARLOOP: Brand: MEDLINE

## (undated) DEVICE — Device: Brand: DEFENDO AIR/WATER/SUCTION AND BIOPSY VALVE

## (undated) DEVICE — GLV SURG EUDERMIC PF LTX 8 STRL

## (undated) DEVICE — KT MANIFLD CARDIAC

## (undated) DEVICE — COVER,MAYO STAND,STERILE: Brand: MEDLINE

## (undated) DEVICE — SPONGE,DRAIN,NONWVN,4"X4",6PLY,STRL,LF: Brand: MEDLINE

## (undated) DEVICE — DISPOSABLE MONOPOLAR ENDOSCOPIC CORD 10 FT. (3M): Brand: KIRWAN

## (undated) DEVICE — GOWN ISOL W/THUMB UNIV BLU BX/15

## (undated) DEVICE — ARM SLING II: Brand: DEROYAL

## (undated) DEVICE — Device

## (undated) DEVICE — SPNG LAP 18X18IN LF STRL PK/5

## (undated) DEVICE — TRANSFER SET 3": Brand: MEDLINE INDUSTRIES, INC.

## (undated) DEVICE — APPL HEMOS FOR DELIVERY FLOSEAL

## (undated) DEVICE — SPNG GZ WOVN 4X4IN 12PLY 10/BX STRL

## (undated) DEVICE — DISPOSABLE TOURNIQUET CUFF SINGLE BLADDER, SINGLE PORT AND LUER LOCK CONNECTOR: Brand: COLOR CUFF

## (undated) DEVICE — PK LAP GEN 90

## (undated) DEVICE — SUT ETHLN 3/0 PS2 18 IN 1669H

## (undated) DEVICE — HARMONIC 700 SHEARS, ADVANCED HEMOSTASIS: Brand: HARMONIC

## (undated) DEVICE — CATH DIAG IMPULSE FR4 5F 100CM

## (undated) DEVICE — SYS BALN KII DISSCT RND C0K12

## (undated) DEVICE — TOME IN SGL

## (undated) DEVICE — FRCP BX RADJAW4 NDL 2.8 240CM LG OG BX40

## (undated) DEVICE — TP ELAS ELASTIKON 1IN

## (undated) DEVICE — TRY SKINPREP WET CHG 4PCT SPNG HIB

## (undated) DEVICE — BW-412T DISP COMBO CLEANING BRUSH: Brand: SINGLE USE COMBINATION CLEANING BRUSH

## (undated) DEVICE — LAB CORP AGAR SLANT UREA PK/10

## (undated) DEVICE — DRAPE,REIN 53X77,STERILE: Brand: MEDLINE

## (undated) DEVICE — RESERVOIR,SUCTION,100CC,SILICONE: Brand: MEDLINE

## (undated) DEVICE — LAG MINOR PROCEDURE: Brand: MEDLINE INDUSTRIES, INC.

## (undated) DEVICE — ENDO. PORT CONNECTOR W/VALVE FOR OLYMPUS® SCOPES: Brand: ERBE

## (undated) DEVICE — LAPAROVUE VISIBILITY SYSTEM LAPAROSCOPIC SOLUTIONS: Brand: LAPAROVUE

## (undated) DEVICE — PK CATH CARD 40

## (undated) DEVICE — NDL HYPO PRECISIONGLIDE REG 25G 1 1/2

## (undated) DEVICE — VIAL FORMALIN CAP 10P 40ML

## (undated) DEVICE — GAUZE,SPONGE,FLUFF,6"X6.75",STRL,5/TRAY: Brand: MEDLINE

## (undated) DEVICE — ENDOCUT SCISSOR TIP, DISPOSABLE: Brand: RENEW

## (undated) DEVICE — SOL IRR H2O BTL 1000ML STRL

## (undated) DEVICE — SUT ETHLN 5/0 PS2 18IN 1666G

## (undated) DEVICE — TOTAL TRAY, DB, 100% SILI FOLEY, 16FR 10: Brand: MEDLINE

## (undated) DEVICE — SUCTION CANISTER, 3000CC,SAFELINER: Brand: DEROYAL

## (undated) DEVICE — GW EMR FIX EXCHG J STD .035 3MM 260CM

## (undated) DEVICE — APPL CHLORAPREP HI/LITE 26ML ORNG

## (undated) DEVICE — TOWEL,OR,DSP,ST,BLUE,STD,4/PK,20PK/CS: Brand: MEDLINE

## (undated) DEVICE — DRSNG WND GZ CURAD OIL EMULSION 3X3IN STRL

## (undated) DEVICE — ADHS SKIN PREMIERPRO EXOFIN TOPICAL HI/VISC .5ML